# Patient Record
Sex: FEMALE | URBAN - METROPOLITAN AREA
[De-identification: names, ages, dates, MRNs, and addresses within clinical notes are randomized per-mention and may not be internally consistent; named-entity substitution may affect disease eponyms.]

---

## 2022-01-02 ENCOUNTER — LAB REQUISITION (OUTPATIENT)
Dept: LAB | Facility: HOSPITAL | Age: 77
End: 2022-01-02
Attending: INTERNAL MEDICINE
Payer: COMMERCIAL

## 2022-01-02 DIAGNOSIS — R05.9 COUGH, UNSPECIFIED: ICD-10-CM

## 2022-01-02 PROCEDURE — 30099996 STAT DAYTIME VENIPUNCTURE: Performed by: INTERNAL MEDICINE

## 2022-11-11 ENCOUNTER — APPOINTMENT (OUTPATIENT)
Dept: RADIOLOGY | Facility: CLINIC | Age: 77
End: 2022-11-11

## 2022-11-11 ENCOUNTER — OFFICE VISIT (OUTPATIENT)
Dept: OBGYN CLINIC | Facility: CLINIC | Age: 77
End: 2022-11-11

## 2022-11-11 VITALS
SYSTOLIC BLOOD PRESSURE: 133 MMHG | HEIGHT: 63 IN | WEIGHT: 191 LBS | BODY MASS INDEX: 33.84 KG/M2 | DIASTOLIC BLOOD PRESSURE: 79 MMHG | HEART RATE: 91 BPM

## 2022-11-11 DIAGNOSIS — M25.561 RIGHT KNEE PAIN, UNSPECIFIED CHRONICITY: ICD-10-CM

## 2022-11-11 DIAGNOSIS — M25.562 LEFT KNEE PAIN, UNSPECIFIED CHRONICITY: ICD-10-CM

## 2022-11-11 DIAGNOSIS — M17.12 PRIMARY OSTEOARTHRITIS OF LEFT KNEE: Primary | ICD-10-CM

## 2022-11-11 DIAGNOSIS — M17.11 PRIMARY OSTEOARTHRITIS OF RIGHT KNEE: ICD-10-CM

## 2022-11-11 RX ORDER — OMEPRAZOLE 20 MG/1
20 CAPSULE, DELAYED RELEASE ORAL DAILY
COMMUNITY

## 2022-11-11 RX ORDER — TRIAMCINOLONE ACETONIDE 40 MG/ML
40 INJECTION, SUSPENSION INTRA-ARTICULAR; INTRAMUSCULAR
Status: COMPLETED | OUTPATIENT
Start: 2022-11-11 | End: 2022-11-11

## 2022-11-11 RX ORDER — LIDOCAINE HYDROCHLORIDE 5 MG/ML
4 INJECTION, SOLUTION INFILTRATION; PERINEURAL
Status: COMPLETED | OUTPATIENT
Start: 2022-11-11 | End: 2022-11-11

## 2022-11-11 RX ORDER — LEVOTHYROXINE SODIUM 0.05 MG/1
50 TABLET ORAL DAILY
COMMUNITY
Start: 2022-08-31

## 2022-11-11 RX ORDER — TAMSULOSIN HYDROCHLORIDE 0.4 MG/1
0.4 CAPSULE ORAL
COMMUNITY

## 2022-11-11 RX ORDER — AMLODIPINE BESYLATE 10 MG/1
10 TABLET ORAL DAILY
COMMUNITY

## 2022-11-11 RX ORDER — ATORVASTATIN CALCIUM 20 MG/1
20 TABLET, FILM COATED ORAL DAILY
COMMUNITY
Start: 2022-11-09

## 2022-11-11 RX ADMIN — LIDOCAINE HYDROCHLORIDE 4 ML: 5 INJECTION, SOLUTION INFILTRATION; PERINEURAL at 18:49

## 2022-11-11 RX ADMIN — TRIAMCINOLONE ACETONIDE 40 MG: 40 INJECTION, SUSPENSION INTRA-ARTICULAR; INTRAMUSCULAR at 18:49

## 2022-11-11 NOTE — PROGRESS NOTES
Knee New Office Note    Assessment:     1  Primary osteoarthritis of left knee    2  Primary osteoarthritis of right knee        Plan:  Findings today are consistent with bilateral knee osteoarthritis  Imaging and prognosis was reviewed with the patient today  Discussed conservative treatments with patient today (CSI, tylenol, Voltaren Gel/ Aspercreme w/ lidocaine, staying active)  Patient would like to proceed with Tylenol as needed: 1000mg every 8 hours - do not exceed 3000mg daily, Aspercreme w/ lidocaine and CSI  Recommend only doing a CSI to the right knee, due to patient being a diabetic  Cortisone steroid injection was administered today to the right knee  Patient should avoid strenuous activities for the next 1-2 days  Patient should avoid vaccines for the next 2 weeks if possible  Should monitor glucose levels for the next 7 to 10 days  Can apply cold compress for soreness  If patient feels relief with the cortisone injections, procedure can be repeated every 3 months  If patient sees minimal/no relief with cortisone injection, treatment with VISCO injections can be further discussed  Patient will consider a future CSI of the left knee  Follow up as needed  Problem List Items Addressed This Visit    None     Visit Diagnoses     Primary osteoarthritis of left knee    -  Primary    Primary osteoarthritis of right knee             Subjective:     Patient ID: Tawni Opitz is a 65 y o  female  Patient seen in consultation at the request of Dr Hussain Beltran MD      Chief Complaint:  HPI:  The patient presents with a chief complaint of bilateral knee pain- right worse than the left  The pain began several months ago and is not associated with an acute injury  Patient notes the pain has increased over the last couple weeks  The patient describes the pain as aching and sharp  It is intermittent in timing, and localizes the pain to the medial joint line   The pain is worse with activities, walking, ascending stairs and descending stairs and relieved with rest, ice, and elevation  She denies mechanical symptoms such as locking and catching  She admits to instability of the knee  Patient has not had any previous treatment  Patient is a diabetic and has kidney disease and can not take NSAIDS  Allergy:  No Known Allergies  Medications:  all current active meds have been reviewed  Past Medical History:  History reviewed  No pertinent past medical history  Past Surgical History:  History reviewed  No pertinent surgical history  Family History:  History reviewed  No pertinent family history  Social History:  Social History     Substance and Sexual Activity   Alcohol Use None     Social History     Substance and Sexual Activity   Drug Use Not on file     Social History     Tobacco Use   Smoking Status Never Smoker   Smokeless Tobacco Never Used           ROS:  General: Per HPI  Skin: Negative, except if noted below  HEENT: Negative  Respiratory: Negative  Cardiovascular: Negative  Gastrointestinal: Negative  Urinary: Negative  Vascular: Negative  Musculoskeletal: Positive per HPI   Neurologic: Positive per HPI  Endocrine: Negative    Objective:  BP Readings from Last 1 Encounters:   11/11/22 133/79      Wt Readings from Last 1 Encounters:   11/11/22 86 6 kg (191 lb)        Respiratory:   non-labored respirations    Lymphatics:  no palpable lymph nodes    Gait:   wheelchair    Neurologic:   Alert and oriented times 3  Patient with normal sensation except as noted below  Deep tendon reflexes 2+ except as noted in MSK exam    Bilateral Lower Extremity:  Left Knee:       Inspection:  Skin intact    Overall limb alignment normal    Effusion: none    ROM 5-110 w/ pain    Extensor Lag: none    Palpation:medial Joint line tenderness to palpation    AP Stability at 90 deg stable    M/L stability in full extension stable    M/L stability in midflexion stable    Motor: 4/5 IP/Q/HS/TA/GS    Pulses: 2+ DP / 2+ PT    SILT DP/SP/S/S/TN    Right knee: Inspection: skin intact     Overall limb alignment normal    Effusion: none    ROM 5-110 w/ pain    Extensor Lag: none    Palpation: medial Joint line tenderness to palpation    AP Stability at 90 deg stable    M/L stability in full extension stable    M/L stability in midflexion stable    Motor: 5/5 IP/Q/HS/TA/GS    Pulses: 2+ DP / 2+ PT    SILT DP/SP/S/S/TN    Imaging:  My interpretation XR AP scanogram/AP bilateral knee/lateral/wetzel/sunrise bilateral knee: moderate joint space narrowing, subchondral sclerosis, subchondral cysts, osteophyte formation  No fracture or dislocation  BMI:   Estimated body mass index is 33 83 kg/m² as calculated from the following:    Height as of this encounter: 5' 3" (1 6 m)  Weight as of this encounter: 86 6 kg (191 lb)  BSA:   Estimated body surface area is 1 9 meters squared as calculated from the following:    Height as of this encounter: 5' 3" (1 6 m)  Weight as of this encounter: 86 6 kg (191 lb)  Large joint arthrocentesis: R knee  Universal Protocol:  Consent: Verbal consent obtained    Risks and benefits: risks, benefits and alternatives were discussed  Consent given by: patient  Patient understanding: patient states understanding of the procedure being performed    Supporting Documentation  Indications: pain   Procedure Details  Location: knee - R knee  Needle size: 22 G  Approach: anterolateral  Medications administered: 4 mL lidocaine 0 5 %; 40 mg triamcinolone acetonide 40 mg/mL    Patient tolerance: patient tolerated the procedure well with no immediate complications  Dressing:  Sterile dressing applied                   Scribe Attestation    I,:  Shyam Goff am acting as a scribe while in the presence of the attending physician :       I,:  Reyes Dimas DO personally performed the services described in this documentation    as scribed in my presence :

## 2022-12-09 ENCOUNTER — OFFICE VISIT (OUTPATIENT)
Dept: OBGYN CLINIC | Facility: CLINIC | Age: 77
End: 2022-12-09

## 2022-12-09 VITALS
WEIGHT: 180 LBS | DIASTOLIC BLOOD PRESSURE: 80 MMHG | BODY MASS INDEX: 31.89 KG/M2 | HEIGHT: 63 IN | SYSTOLIC BLOOD PRESSURE: 136 MMHG

## 2022-12-09 DIAGNOSIS — M17.12 PRIMARY OSTEOARTHRITIS OF LEFT KNEE: Primary | ICD-10-CM

## 2022-12-09 DIAGNOSIS — M17.11 PRIMARY OSTEOARTHRITIS OF RIGHT KNEE: ICD-10-CM

## 2022-12-09 RX ORDER — TRIAMCINOLONE ACETONIDE 40 MG/ML
40 INJECTION, SUSPENSION INTRA-ARTICULAR; INTRAMUSCULAR
Status: COMPLETED | OUTPATIENT
Start: 2022-12-09 | End: 2022-12-09

## 2022-12-09 RX ADMIN — TRIAMCINOLONE ACETONIDE 40 MG: 40 INJECTION, SUSPENSION INTRA-ARTICULAR; INTRAMUSCULAR at 11:32

## 2022-12-09 NOTE — PROGRESS NOTES
Knee Follow up  Office Note    Assessment:     1  Primary osteoarthritis of left knee    2  Primary osteoarthritis of right knee        Plan:     Problem List Items Addressed This Visit    None  Visit Diagnoses     Primary osteoarthritis of left knee    -  Primary    Relevant Orders    Injection Procedure Prior Authorization    Large joint arthrocentesis: L knee    Primary osteoarthritis of right knee        Relevant Orders    Injection Procedure Prior Authorization    Large joint arthrocentesis: L knee          66 y/o female with bilateral knee pain secondary to OA  She had a cortisone injection into the right knee at last visit with short term relief in symptoms  She continues with pain in the left knee as well  Cortisone injection performed into the left knee today  Discussed that since she is only getting short term relief with the cortisone, we will also order visco injections for the bilateral knees  Follow up to begin visco      Subjective:     Patient ID: Otilia Martel is a 65 y o  female  Chief Complaint:  HPI:  The patient presents with a chief complaint of bilateral knee pain  She has known OA of the bilateral knees  The pain began several months ago and is not associated with an acute injury  The pain is worse with activities, walking, ascending stairs and descending stairs and relieved with rest, ice, and elevation  Patient is a diabetic and has kidney disease and can not take NSAIDS  At last visit she had a cortisone injection into the Right knee with only about 2 weeks of relief  She presents today for a cortisone injection into her LEFT knee  Allergy:  No Known Allergies  Medications:  all current active meds have been reviewed  Past Medical History:  History reviewed  No pertinent past medical history  Past Surgical History:  History reviewed  No pertinent surgical history  Family History:  History reviewed  No pertinent family history    Social History:  Social History     Substance and Sexual Activity   Alcohol Use None     Social History     Substance and Sexual Activity   Drug Use Not on file     Social History     Tobacco Use   Smoking Status Never   Smokeless Tobacco Never           ROS:  General: Per HPI  Skin: Negative, except if noted below  HEENT: Negative  Respiratory: Negative  Cardiovascular: Negative  Gastrointestinal: Negative  Urinary: Negative  Vascular: Negative  Musculoskeletal: Positive per HPI   Neurologic: Positive per HPI  Endocrine: Negative    Objective:  BP Readings from Last 1 Encounters:   12/09/22 136/80      Wt Readings from Last 1 Encounters:   12/09/22 81 6 kg (180 lb)        Respiratory:   non-labored respirations    Lymphatics:  no palpable lymph nodes    Gait:   Altered, with rollator walker  Neurologic:   Alert and oriented times 3  Patient with normal sensation except as noted below  Deep tendon reflexes 2+ except as noted in MSK exam    Bilateral Lower Extremity:  Left Knee: Inspection:  Skin intact    Overall limb alignment normal    Effusion: none    ROM 5-110 w/ pain    Extensor Lag: none    Palpation:medial Joint line tenderness to palpation    AP Stability at 90 deg stable    M/L stability in full extension stable    M/L stability in midflexion stable    Motor: 4/5 IP/Q/HS/TA/GS    Pulses: 2+ DP / 2+ PT    SILT DP/SP/S/S/TN     Right knee: Inspection: skin intact     Overall limb alignment normal    Effusion: none    ROM 5-110 w/ pain    Extensor Lag: none    Palpation: medial Joint line tenderness to palpation    AP Stability at 90 deg stable    M/L stability in full extension stable    M/L stability in midflexion stable    Motor: 5/5 IP/Q/HS/TA/GS    Pulses: 2+ DP / 2+ PT    SILT DP/SP/S/S/TN    Imaging:  No new imaging today    BMI:   Estimated body mass index is 31 89 kg/m² as calculated from the following:    Height as of this encounter: 5' 3" (1 6 m)  Weight as of this encounter: 81 6 kg (180 lb)    BSA:   Estimated body surface area is 1 85 meters squared as calculated from the following:    Height as of this encounter: 5' 3" (1 6 m)  Weight as of this encounter: 81 6 kg (180 lb)  Large joint arthrocentesis: L knee  Universal Protocol:  Consent: Verbal consent obtained    Risks and benefits: risks, benefits and alternatives were discussed  Consent given by: patient  Patient understanding: patient states understanding of the procedure being performed    Supporting Documentation  Indications: pain   Procedure Details  Location: knee - L knee  Preparation: Patient was prepped and draped in the usual sterile fashion  Needle size: 22 G  Approach: anterolateral  Medications administered: 40 mg triamcinolone acetonide 40 mg/mL (4mL 0 5% ropivicaine)    Patient tolerance: patient tolerated the procedure well with no immediate complications  Dressing:  Sterile dressing applied          Scribe Attestation    I,:  Tawnya Yates PA-C am acting as a scribe while in the presence of the attending physician :       I,:  Agustina Matt DO personally performed the services described in this documentation    as scribed in my presence :

## 2022-12-16 ENCOUNTER — PROCEDURE VISIT (OUTPATIENT)
Dept: OBGYN CLINIC | Facility: CLINIC | Age: 77
End: 2022-12-16

## 2022-12-16 VITALS
BODY MASS INDEX: 31.89 KG/M2 | WEIGHT: 180 LBS | DIASTOLIC BLOOD PRESSURE: 78 MMHG | HEIGHT: 63 IN | SYSTOLIC BLOOD PRESSURE: 132 MMHG

## 2022-12-16 DIAGNOSIS — M17.11 PRIMARY OSTEOARTHRITIS OF RIGHT KNEE: Primary | ICD-10-CM

## 2022-12-16 DIAGNOSIS — M17.12 PRIMARY OSTEOARTHRITIS OF LEFT KNEE: ICD-10-CM

## 2022-12-16 NOTE — PROGRESS NOTES
[facility] bilateral knees      Large joint arthrocentesis: bilateral knee  Universal Protocol:  Consent: Verbal consent obtained  Risks and benefits: risks, benefits and alternatives were discussed  Consent given by: patient  Patient understanding: patient states understanding of the procedure being performed  Patient identity confirmed: verbally with patient    Supporting Documentation  Indications: pain   Procedure Details  Location: knee - bilateral knee  Needle size: 22 G  Approach: anterolateral    Medications (Right): 88 mg hyaluronan 88 MG/4MLMedications (Left): 88 mg hyaluronan 88 MG/4ML   Patient tolerance: patient tolerated the procedure well with no immediate complications  Dressing:  Sterile dressing applied          1  Primary osteoarthritis of right knee        2   Primary osteoarthritis of left knee               Miguel Dorado PA-C

## 2023-04-14 LAB
LEFT EYE DIABETIC RETINOPATHY: NORMAL
RIGHT EYE DIABETIC RETINOPATHY: NORMAL

## 2023-09-14 ENCOUNTER — CONSULT (OUTPATIENT)
Age: 78
End: 2023-09-14
Payer: COMMERCIAL

## 2023-09-14 VITALS
SYSTOLIC BLOOD PRESSURE: 126 MMHG | DIASTOLIC BLOOD PRESSURE: 70 MMHG | OXYGEN SATURATION: 96 % | WEIGHT: 188 LBS | TEMPERATURE: 97.7 F | BODY MASS INDEX: 33.31 KG/M2 | HEIGHT: 63 IN | HEART RATE: 80 BPM

## 2023-09-14 DIAGNOSIS — Z86.718 HISTORY OF DVT OF LOWER EXTREMITY: ICD-10-CM

## 2023-09-14 DIAGNOSIS — J84.10 PULMONARY FIBROSIS (HCC): Primary | ICD-10-CM

## 2023-09-14 PROBLEM — E11.9 TYPE 2 DIABETES MELLITUS (HCC): Status: ACTIVE | Noted: 2023-09-14

## 2023-09-14 PROCEDURE — 99204 OFFICE O/P NEW MOD 45 MIN: CPT | Performed by: INTERNAL MEDICINE

## 2023-09-14 RX ORDER — ESCITALOPRAM OXALATE 20 MG/1
20 TABLET ORAL
COMMUNITY
Start: 2023-06-21

## 2023-09-14 RX ORDER — LEVOTHYROXINE SODIUM 112 UG/1
112 TABLET ORAL DAILY
COMMUNITY
Start: 2023-08-07

## 2023-09-14 NOTE — ASSESSMENT & PLAN NOTE
Patient has a given diagnosis of pulmonary fibrosis going back to July 2021. I do not have any imaging or PFTs to review and the patient states these were done greater than 1 year ago. We will obtain a new baseline with PFTs and high-resolution chest CT. We will also obtain records from Dr. Jazmin Lee office for comparison. Depending on those findings and whether there is evidence of progression, may need to consider further testing and treatment options. It is unclear whether she was tested for rheumatologic conditions at the time of diagnosis. If she has not, we will order those tests as well.

## 2023-09-14 NOTE — PROGRESS NOTES
Pulmonary Outpatient Consultation Note   Adam Santana 77 y.o. female MRN: [id number]  9/14/2023    Referring provider: No referring provider defined for this encounter. Assessment/Plan:      Pulmonary fibrosis ([address])  Patient has a given diagnosis of pulmonary fibrosis going back to July 2021. I do not have any imaging or PFTs to review and the patient states these were done greater than 1 year ago. We will obtain a new baseline with PFTs and high-resolution chest CT. We will also obtain records from Dr. Ml Crane office for comparison. Depending on those findings and whether there is evidence of progression, may need to consider further testing and treatment options. It is unclear whether she was tested for rheumatologic conditions at the time of diagnosis. If she has not, we will order those tests as well. Visit orders:    Diagnoses and all orders for this visit:    Pulmonary fibrosis ([address])  -     CT chest high resolution; Future  -     Complete PFT with post bronchodilator; Future    History of DVT of lower extremity    Other orders  -     escitalopram (LEXAPRO) 20 mg tablet; Take 20 mg by mouth daily at bedtime  -     levothyroxine 112 mcg tablet; Take 112 mcg by mouth daily        History of Present Illness   HPI:  Adam Santana is a 77 y.o. female who presents today to establish pulmonary care. She was hospitalized in July 2021 and diagnosed with pneumonia. She describes a fairly prolonged hospitalization, followed by rehab stay due to inability to walk. There is some question as to whether she was paralyzed at that time. She was subsequently diagnosed with pulmonary fibrosis as a reason for progressive shortness of breath. She was on prednisone for greater than 1 year at doses as high as 30 mg daily. She became diabetic and is now on metformin. She did not find that the prednisone helped with her breathlessness.   From pulmonary perspective, she feels that her shortness of breath has been relatively unchanged over the past year. She finds it difficult to ambulate, both from a pulmonary standpoint as well as lower extremity weakness. She is able to ambulate more than she had 2 years ago and is now living independently. He still leads a very sedentary lifestyle. She was never diagnosed with asthma or COPD and she is a lifelong non-smoker. She denies cough or sputum production. She denies wheezing. She does not use inhalers or home oxygen. She has been following with Dr. Sanam Castillo at [facility] since that time, but he recently moved out of the area. She has osteoarthritis and denies joint swelling. She has never been diagnosed with any particular rheumatologic disorder but does report Raynaud's syndrome in her fingers and toes. Denies heartburn, indigestion or aspiration events. She had pneumonia at the age of 7 and was told she had scarring on her lungs since that time. She had a DVT in 2020 and is maintained on Eliquis. She has no pets at home. She has no occupational exposures to speak of. Review of Systems   Constitutional: Negative for chills, fever and unexpected weight change. HENT: Negative for postnasal drip and sore throat. Eyes: Negative for visual disturbance. Respiratory:        As noted in HPI   Cardiovascular: Negative for chest pain. Gastrointestinal: Negative for abdominal pain, diarrhea and vomiting. Musculoskeletal: Negative for arthralgias. Skin: Negative for rash. Neurological: Negative for headaches. Hematological: Negative for adenopathy. Psychiatric/Behavioral: Negative. All other systems reviewed and are negative.         Historical Information   Past Medical History:   Diagnosis Date   • Cataract 03/2023   • Deep vein thrombosis (HCC)    • Diabetes mellitus ([address])    • Hypertension    • Hypothyroid    • Osteoarthritis    • Pulmonary fibrosis [facility])      Past Surgical History:   Procedure Laterality Date   • SKIN CANCER EXCISION       Family History   Problem Relation Age of Onset   • Breast cancer Mother    • Heart disease Father        Social History     Tobacco Use   Smoking Status Never   Smokeless Tobacco Never       Meds/Allergies     Current Outpatient Medications:   •  amLODIPine (NORVASC) 10 mg tablet, Take 10 mg by mouth daily, Disp: , Rfl:   •  apixaban (Eliquis) 5 mg, Take 5 mg by mouth 1 (one) time, Disp: , Rfl:   •  escitalopram (LEXAPRO) 20 mg tablet, Take 20 mg by mouth daily at bedtime, Disp: , Rfl:   •  levothyroxine 112 mcg tablet, Take 112 mcg by mouth daily, Disp: , Rfl:   •  omeprazole (PriLOSEC) 20 mg delayed release capsule, Take 20 mg by mouth daily, Disp: , Rfl:   •  tamsulosin (FLOMAX) 0.4 mg, Take 0.4 mg by mouth daily with dinner, Disp: , Rfl:   •  atorvastatin (LIPITOR) 20 mg tablet, Take 20 mg by mouth daily (Patient not taking: Reported on 9/14/2023), Disp: , Rfl:   •  levothyroxine 50 mcg tablet, Take 50 mcg by mouth daily (Patient not taking: Reported on 9/14/2023), Disp: , Rfl:   •  metFORMIN (GLUCOPHAGE) 500 mg tablet, Take 500 mg by mouth daily (Patient not taking: Reported on 9/14/2023), Disp: , Rfl:   No Known Allergies    Vitals: Blood pressure 126/70, pulse 80, temperature 97.7 °F (36.5 °C), height 5' 3" (1.6 m), weight 85.3 kg (188 lb), SpO2 96 %. , Body mass index is 33.3 kg/m². Oxygen Therapy  SpO2: 96 %  Oxygen Therapy: None (Room air)    Physical Exam   Physical Exam  Constitutional:       General: She is not in acute distress. Appearance: Normal appearance. HENT:      Head: Normocephalic. Mouth/Throat:      Pharynx: No oropharyngeal exudate. Eyes:      General: No scleral icterus. Neck:      Vascular: No JVD. Cardiovascular:      Rate and Rhythm: Normal rate and regular rhythm. Pulmonary:      Breath sounds: Rales (At the bases) present. No wheezing or rhonchi. Abdominal:      Palpations: Abdomen is soft. Tenderness: There is no abdominal tenderness. Musculoskeletal:         General: No deformity. Cervical back: Neck supple. Lymphadenopathy:      Cervical: No cervical adenopathy. Skin:     General: Skin is warm and dry. Neurological:      Mental Status: She is alert and oriented to person, place, and time.    Psychiatric:         Mood and Affect: Mood normal.

## 2023-10-18 ENCOUNTER — HOSPITAL ENCOUNTER (OUTPATIENT)
Dept: PULMONOLOGY | Facility: HOSPITAL | Age: 78
Discharge: HOME | End: 2023-10-18
Attending: INTERNAL MEDICINE
Payer: COMMERCIAL

## 2023-10-18 DIAGNOSIS — J84.10 PULMONARY FIBROSIS (HCC): ICD-10-CM

## 2023-10-18 PROCEDURE — 94729 DIFFUSING CAPACITY: CPT

## 2023-10-18 PROCEDURE — 94060 EVALUATION OF WHEEZING: CPT

## 2023-10-18 PROCEDURE — 94726 PLETHYSMOGRAPHY LUNG VOLUMES: CPT | Performed by: INTERNAL MEDICINE

## 2023-10-18 PROCEDURE — 94760 N-INVAS EAR/PLS OXIMETRY 1: CPT

## 2023-10-18 PROCEDURE — 94726 PLETHYSMOGRAPHY LUNG VOLUMES: CPT

## 2023-10-18 PROCEDURE — 94060 EVALUATION OF WHEEZING: CPT | Performed by: INTERNAL MEDICINE

## 2023-10-18 PROCEDURE — 94729 DIFFUSING CAPACITY: CPT | Performed by: INTERNAL MEDICINE

## 2023-10-18 RX ORDER — ALBUTEROL SULFATE 2.5 MG/3ML
2.5 SOLUTION RESPIRATORY (INHALATION) ONCE
Status: COMPLETED | OUTPATIENT
Start: 2023-10-18 | End: 2023-10-18

## 2023-10-18 RX ADMIN — ALBUTEROL SULFATE 2.5 MG: 2.5 SOLUTION RESPIRATORY (INHALATION) at 10:52

## 2023-10-30 ENCOUNTER — HOSPITAL ENCOUNTER (OUTPATIENT)
Dept: CT IMAGING | Facility: HOSPITAL | Age: 78
Discharge: HOME | End: 2023-10-30
Attending: INTERNAL MEDICINE
Payer: COMMERCIAL

## 2023-10-30 ENCOUNTER — APPOINTMENT (OUTPATIENT)
Dept: RADIOLOGY | Facility: HOSPITAL | Age: 78
End: 2023-10-30
Payer: COMMERCIAL

## 2023-10-30 DIAGNOSIS — J84.10 PULMONARY FIBROSIS (HCC): ICD-10-CM

## 2023-10-30 PROCEDURE — G1004 CDSM NDSC: HCPCS

## 2023-10-30 PROCEDURE — 71250 CT THORAX DX C-: CPT

## 2023-11-10 DIAGNOSIS — J84.10 PULMONARY FIBROSIS (HCC): Primary | ICD-10-CM

## 2023-11-10 NOTE — PROGRESS NOTES
Pulmonary function testing with moderate restriction and severe diffusion impairment. Chest CT concerning for UIP pattern. I will send off serologies working up rheumatologic etiology and have the patient follow-up with me thereafter to discuss whether we should initiate antifibrotic agents.

## 2023-12-01 ENCOUNTER — TELEPHONE (OUTPATIENT)
Age: 78
End: 2023-12-01

## 2023-12-06 ENCOUNTER — TELEPHONE (OUTPATIENT)
Dept: FAMILY MEDICINE CLINIC | Facility: CLINIC | Age: 78
End: 2023-12-06

## 2023-12-06 ENCOUNTER — OFFICE VISIT (OUTPATIENT)
Dept: FAMILY MEDICINE CLINIC | Facility: CLINIC | Age: 78
End: 2023-12-06
Payer: COMMERCIAL

## 2023-12-06 VITALS
OXYGEN SATURATION: 100 % | DIASTOLIC BLOOD PRESSURE: 60 MMHG | HEART RATE: 90 BPM | WEIGHT: 182 LBS | HEIGHT: 62 IN | TEMPERATURE: 98.7 F | RESPIRATION RATE: 18 BRPM | BODY MASS INDEX: 33.49 KG/M2 | SYSTOLIC BLOOD PRESSURE: 140 MMHG

## 2023-12-06 DIAGNOSIS — E78.00 HYPERCHOLESTEROLEMIA: ICD-10-CM

## 2023-12-06 DIAGNOSIS — Z86.711 HISTORY OF PULMONARY EMBOLISM: ICD-10-CM

## 2023-12-06 DIAGNOSIS — M54.50 LOW BACK PAIN POTENTIALLY ASSOCIATED WITH RADICULOPATHY: ICD-10-CM

## 2023-12-06 DIAGNOSIS — E11.9 TYPE 2 DIABETES MELLITUS WITHOUT COMPLICATION, WITHOUT LONG-TERM CURRENT USE OF INSULIN (HCC): ICD-10-CM

## 2023-12-06 DIAGNOSIS — Z78.0 POST-MENOPAUSAL: ICD-10-CM

## 2023-12-06 DIAGNOSIS — F32.1 CURRENT MODERATE EPISODE OF MAJOR DEPRESSIVE DISORDER WITHOUT PRIOR EPISODE (HCC): ICD-10-CM

## 2023-12-06 DIAGNOSIS — R53.83 OTHER FATIGUE: ICD-10-CM

## 2023-12-06 DIAGNOSIS — Z86.718 HISTORY OF DVT OF LOWER EXTREMITY: ICD-10-CM

## 2023-12-06 DIAGNOSIS — J84.10 PULMONARY FIBROSIS (HCC): Primary | ICD-10-CM

## 2023-12-06 PROBLEM — K57.90 DIVERTICULOSIS: Status: ACTIVE | Noted: 2023-12-06

## 2023-12-06 PROBLEM — R76.8 ANA POSITIVE: Status: ACTIVE | Noted: 2023-12-06

## 2023-12-06 PROBLEM — E66.9 OBESITY: Status: ACTIVE | Noted: 2023-12-06

## 2023-12-06 PROBLEM — Z79.01 ANTICOAGULATED: Status: ACTIVE | Noted: 2023-12-06

## 2023-12-06 PROBLEM — J84.9 INTERSTITIAL LUNG DISEASE (HCC): Status: ACTIVE | Noted: 2022-09-29

## 2023-12-06 PROBLEM — M17.0 OSTEOARTHRITIS OF BOTH KNEES: Status: ACTIVE | Noted: 2023-12-06

## 2023-12-06 LAB — SL AMB POCT HEMOGLOBIN AIC: 5.7 (ref ?–6.5)

## 2023-12-06 PROCEDURE — 83036 HEMOGLOBIN GLYCOSYLATED A1C: CPT | Performed by: FAMILY MEDICINE

## 2023-12-06 PROCEDURE — 99204 OFFICE O/P NEW MOD 45 MIN: CPT | Performed by: FAMILY MEDICINE

## 2023-12-06 NOTE — PROGRESS NOTES
Name: Gennaro Anthony      : 1945      MRN: [id number]  Encounter Provider: Moises Diaz DO  Encounter Date: 2023   Encounter department: [facility] [address]     1. Pulmonary fibrosis [facility])  Patient seeing [facility] pulmonology for this. 2. Type 2 diabetes mellitus without complication, without long-term current use of insulin (HCC)  -     POCT hemoglobin A1c  -     Albumin / creatinine urine ratio; Future; Expected date: 2023  -     Comprehensive metabolic panel; Future; Expected date: 2023  -     Albumin / creatinine urine ratio  -     Comprehensive metabolic panel  Patient denies this diagnosis   She is on no medications for diabetes  Sounds like sugars were elevated from her steroid use for the pulmonary fibrosis. A1c in office today was 5.7. Did collect urine microalbumin to send out. Will obtain records. 3. Current moderate episode of major depressive disorder without prior episode (HCC)  Stable on lexapro 20 mg once daily  4. History of DVT of lower extremity  On eliquis 5 mg once daily for her history of DVT and PE  5. History of pulmonary embolism    6. Hypercholesterolemia  -     Lipid Panel with Direct LDL reflex; Future; Expected date: 2023  -     Lipid Panel with Direct LDL reflex  She has hyperlipidemia and on atorvastatin. Tolerates well  Check lipid panel  7. Other fatigue  -     TSH, 3rd generation with Free T4 reflex; Future; Expected date: 2023  -     CBC and differential; Future  -     TSH, 3rd generation with Free T4 reflex  -     CBC and differential    8. BMI 33.0-33.9,adult    9. Low back pain potentially associated with radiculopathy  -     XR spine lumbar minimum 4 views non injury; Future; Expected date: 2023  -     Ambulatory Referral to Physical Therapy; Future  Refer to PT  Check xray lumbar spine  Obtain records.  She has previously been to pain management for injections and saw chiropractor  10. Post-menopausal  -     DXA bone density spine hip and pelvis; Future; Expected date: 12/06/2023    Diabetic Foot Exam    Patient's shoes and socks removed. Right Foot/Ankle   Right Foot Inspection  Skin Exam: skin normal, skin intact and dry skin. No warmth, no callus, no erythema, no maceration, no abnormal color, no pre-ulcer, no ulcer and no callus. Toe Exam: ROM and strength within normal limits. Sensory   Vibration: intact  Proprioception: intact  Monofilament testing: intact    Vascular  Capillary refills: < 3 seconds  The right DP pulse is 2+. The right PT pulse is 2+. Left Foot/Ankle  Left Foot Inspection  Skin Exam: skin normal, skin intact and dry skin. No warmth, no erythema, no maceration, normal color, no pre-ulcer, no ulcer and no callus. Toe Exam: ROM and strength within normal limits. Sensory   Vibration: intact  Proprioception: intact  Monofilament testing: intact    Vascular  Capillary refills: < 3 seconds  The left DP pulse is 2+. The left PT pulse is 2+. Assign Risk Category  No deformity present  No loss of protective sensation  No weak pulses  Risk: 0  BMI Counseling: Body mass index is 33.83 kg/m². The BMI is above normal. Nutrition recommendations include decreasing portion sizes, encouraging healthy choices of fruits and vegetables and moderation in carbohydrate intake. Exercise recommendations include exercising 3-5 times per week. No pharmacotherapy was ordered. Rationale for BMI follow-up plan is due to patient being overweight or obese. Depression Screening and Follow-up Plan: Patient was screened for depression during today's encounter. They screened negative with a PHQ-2 score of 0. Subjective     HPI  Patient is a 77year old female who is being seen today as a new patient to establish care. Her previous PCP=Dr. Cooley at [facility]. There are no records for review at time of today's visit.    She is accompanied to today's visit by her aide. All history obtained from patient. Did not get flu vaccine this season. Declines. Chronic medical problems as noted below:  Patient Active Problem List   Diagnosis   • Pulmonary fibrosis (HCC)--follows with [facility] pulmonology (Dr Ricardo Nguyen)   • Type 2 diabetes mellitus (HCC)--started after being on high doses of prednisone in  for her pulmonary fibrosis   • History of DVT of lower extremity-not sure which leg; on eliquis   • ROHIT positive   • Anticoagulated   • Diverticulosis   • History of pulmonary embolism   • Hypercholesterolemia   • Interstitial lung disease ([facility])  Low back pain with radiculopathy--saw chiropractor and pain management. Had injections. Does not recall who she saw. Her pain is getting worse. Difficulty standing up straight due to her pain. • Obesity  Depression--on lexapro for the last year. PHQ-2/9 Depression Screening    Little interest or pleasure in doing things: 0 - not at all  Feeling down, depressed, or hopeless: 0 - not at all  Trouble falling or staying asleep, or sleeping too much: 0 - not at all  Feeling tired or having little energy: 0 - not at all  Poor appetite or overeatin - not at all  Feeling bad about yourself - or that you are a failure or have let yourself or your family down: 0 - not at all  Trouble concentrating on things, such as reading the newspaper or watching television: 0 - not at all  Moving or speaking so slowly that other people could have noticed.  Or the opposite - being so fidgety or restless that you have been moving around a lot more than usual: 0 - not at all  Thoughts that you would be better off dead, or of hurting yourself in some way: 0 - not at all  PHQ-2 Score: 0  PHQ-2 Interpretation: Negative depression screen  PHQ-9 Score: 0   PHQ-9 Interpretation: No or Minimal depression               Review of Systems    Past Medical History:   Diagnosis Date   • Cataract 2023   • Deep vein thrombosis ([facility])    • Diabetes mellitus ([address])    • Diverticulitis    • Hypertension    • Hypothyroid    • Osteoarthritis    • Pneumonia    • Pulmonary fibrosis ([address])      Past Surgical History:   Procedure Laterality Date   • CATARACT EXTRACTION     • CHOLECYSTECTOMY     • SKIN CANCER EXCISION       Family History   Problem Relation Age of Onset   • Breast cancer Mother    • Heart disease Father      Social History     Socioeconomic History   • Marital status: /Civil Union     Spouse name: None   • Number of children: None   • Years of education: None   • Highest education level: None   Occupational History   • None   Tobacco Use   • Smoking status: Never   • Smokeless tobacco: Never   Vaping Use   • Vaping Use: Never used   Substance and Sexual Activity   • Alcohol use: Not Currently   • Drug use: Never   • Sexual activity: Not Currently     Partners: Male   Other Topics Concern   • None   Social History Narrative     since 4    Has son and daughter     Lives alone in an apartment     Social Determinants of Health     Financial Resource Strain: Not on file   Food Insecurity: Not on file   Transportation Needs: Not on file   Physical Activity: Not on file   Stress: Not on file   Social Connections: Not on file   Intimate Partner Violence: Not on file   Housing Stability: Not on file     Current Outpatient Medications on File Prior to Visit   Medication Sig   • amLODIPine (NORVASC) 10 mg tablet Take 10 mg by mouth daily   • apixaban (Eliquis) 5 mg Take 5 mg by mouth 1 (one) time   • atorvastatin (LIPITOR) 20 mg tablet Take 20 mg by mouth daily   • escitalopram (LEXAPRO) 20 mg tablet Take 20 mg by mouth daily at bedtime   • levothyroxine 112 mcg tablet Take 112 mcg by mouth daily   • omeprazole (PriLOSEC) 20 mg delayed release capsule Take 20 mg by mouth as needed   • tamsulosin (FLOMAX) 0.4 mg Take 0.4 mg by mouth daily with dinner   • [DISCONTINUED] levothyroxine 50 mcg tablet Take 50 mcg by mouth daily (Patient not taking: Reported on 9/14/2023)   • [DISCONTINUED] metFORMIN (GLUCOPHAGE) 500 mg tablet Take 500 mg by mouth daily (Patient not taking: Reported on 9/14/2023)     No Known Allergies  Immunization History   Administered Date(s) Administered   • COVID-19 PFIZER VACCINE 0.3 ML IM 03/16/2021, 04/06/2021   • Pneumococcal Conjugate Vaccine 20-valent (Pcv20), Polysace 10/28/2022       Objective     /60 (BP Location: Left arm, Patient Position: Sitting, Cuff Size: Standard)   Pulse 90   Temp 98.7 °F (37.1 °C) (Tympanic)   Resp 18   Ht 5' 1.5" (1.562 m)   Wt 82.6 kg (182 lb)   SpO2 100%   BMI 33.83 kg/m²     Physical Exam  Vitals and nursing note reviewed. Constitutional:       General: She is not in acute distress. Appearance: Normal appearance. She is obese. She is not ill-appearing, toxic-appearing or diaphoretic. Comments: Ambulates with walker   HENT:      Nose: Nose normal.   Eyes:      Conjunctiva/sclera: Conjunctivae normal.   Cardiovascular:      Rate and Rhythm: Normal rate and regular rhythm. Pulses: no weak pulses          Dorsalis pedis pulses are 2+ on the right side and 2+ on the left side. Posterior tibial pulses are 2+ on the right side and 2+ on the left side. Heart sounds: No murmur heard. Pulmonary:      Effort: Pulmonary effort is normal.      Breath sounds: Rales (dry crackles bilateral) present. Abdominal:      General: Abdomen is flat. Bowel sounds are normal.      Palpations: Abdomen is soft. Musculoskeletal:      Cervical back: Normal range of motion and neck supple. Right lower leg: No edema. Left lower leg: No edema. Feet:      Right foot:      Skin integrity: Dry skin present. No ulcer, skin breakdown, erythema, warmth or callus. Left foot:      Skin integrity: Dry skin present. No ulcer, skin breakdown, erythema, warmth or callus. Lymphadenopathy:      Cervical: No cervical adenopathy. Skin:     General: Skin is dry. Findings: No rash. Neurological:      General: No focal deficit present. Mental Status: She is alert and oriented to person, place, and time.    Psychiatric:         Mood and Affect: Mood normal.   Joanie Tapia DO

## 2023-12-13 ENCOUNTER — OFFICE VISIT (OUTPATIENT)
Age: 78
End: 2023-12-13
Payer: COMMERCIAL

## 2023-12-13 VITALS
HEIGHT: 63 IN | HEART RATE: 78 BPM | BODY MASS INDEX: 31.82 KG/M2 | TEMPERATURE: 97.7 F | DIASTOLIC BLOOD PRESSURE: 78 MMHG | SYSTOLIC BLOOD PRESSURE: 118 MMHG | WEIGHT: 179.6 LBS | OXYGEN SATURATION: 94 %

## 2023-12-13 DIAGNOSIS — J84.10 PULMONARY FIBROSIS (HCC): Primary | ICD-10-CM

## 2023-12-13 DIAGNOSIS — J96.11 CHRONIC RESPIRATORY FAILURE WITH HYPOXIA (HCC): ICD-10-CM

## 2023-12-13 PROCEDURE — 99214 OFFICE O/P EST MOD 30 MIN: CPT | Performed by: INTERNAL MEDICINE

## 2023-12-13 NOTE — ASSESSMENT & PLAN NOTE
Patient feels that her symptoms are overall stable and this is confirmed by imaging for at least the past year. She has diffusion impairment and mild restriction on PFTs. We discussed the option of pursuing antifibrotic agent such as Ofev or Esbriet. We discussed the risks and benefits and at this time she would favor holding off. We will plan to repeat PFTs with 6-minute walk test in 6 months and have her follow-up thereafter. Prior to that visit, she will have labs done to complete her ILD workup.

## 2023-12-13 NOTE — PROGRESS NOTES
Pulmonary Follow Up Note   Nolan Longo 77 y.o. female MRN: [id number]  12/13/2023      Assessment/Plan:     Pulmonary fibrosis [facility])  Patient feels that her symptoms are overall stable and this is confirmed by imaging for at least the past year. She has diffusion impairment and mild restriction on PFTs. We discussed the option of pursuing antifibrotic agent such as Ofev or Esbriet. We discussed the risks and benefits and at this time she would favor holding off. We will plan to repeat PFTs with 6-minute walk test in 6 months and have her follow-up thereafter. Prior to that visit, she will have labs done to complete her ILD workup. Return in about 6 months (around 6/13/2024). Visit orders:    Diagnoses and all orders for this visit:    Pulmonary fibrosis ([address])  -     Complete PFT with post Bronchodilator and Six Minute walk; Future  -     RF Screen w/ Reflex to Titer; Future  -     Cyclic citrul peptide antibody, IgG; Future  -     Aldolase; Future  -     ROHIT Screen w/ Reflex to Titer/Pattern; Future  -     Hypersensitivity pneumonitis profile; Future  -     C-reactive protein; Future  -     Aldolase  -     Hypersensitivity pneumonitis profile  -     C-reactive protein    Chronic respiratory failure with hypoxia (HCC)      History of Present Illness   HPI:  Nolan Longo is a 77 y.o. female who is here today for follow-up regarding pulmonary fibrosis, likely IPF. Her overall symptoms are stable. She has some shortness of breath with exertion. She has supplemental oxygen at home but does not use it on a regular basis. She has an occasional wheeze, typically toward the evening hours. She did not have the blood work done that I had ordered after our last visit. Review of Systems   Constitutional:  Negative for chills, fever and unexpected weight change. HENT:  Negative for postnasal drip and sore throat. Eyes:  Negative for visual disturbance.    Respiratory:          As noted in HPI Cardiovascular:  Negative for chest pain. Gastrointestinal:  Negative for abdominal pain, diarrhea and vomiting. Musculoskeletal:  Negative for arthralgias. Skin:  Negative for rash. Neurological:  Negative for headaches. Hematological:  Negative for adenopathy. Psychiatric/Behavioral: Negative. All other systems reviewed and are negative. Medical, Family and Social history reviewed and updated as appropriate    Historical Information   Past Medical History:   Diagnosis Date    Cataract 03/2023    Deep vein thrombosis (HCC)     Diabetes mellitus ([address])     Diverticulitis     Hypertension     Hypothyroid     Osteoarthritis     Pneumonia     Pulmonary fibrosis ([address])      Past Surgical History:   Procedure Laterality Date    CATARACT EXTRACTION      CHOLECYSTECTOMY      SKIN CANCER EXCISION       Family History   Problem Relation Age of Onset    Breast cancer Mother     Heart disease Father        Social History     Tobacco Use   Smoking Status Never   Smokeless Tobacco Never     Meds/Allergies     Current Outpatient Medications:     amLODIPine (NORVASC) 10 mg tablet, Take 10 mg by mouth daily, Disp: , Rfl:     apixaban (Eliquis) 5 mg, Take 5 mg by mouth 1 (one) time, Disp: , Rfl:     atorvastatin (LIPITOR) 20 mg tablet, Take 20 mg by mouth daily, Disp: , Rfl:     escitalopram (LEXAPRO) 20 mg tablet, Take 20 mg by mouth daily at bedtime, Disp: , Rfl:     levothyroxine 112 mcg tablet, Take 112 mcg by mouth daily, Disp: , Rfl:     omeprazole (PriLOSEC) 20 mg delayed release capsule, Take 20 mg by mouth as needed, Disp: , Rfl:     tamsulosin (FLOMAX) 0.4 mg, Take 0.4 mg by mouth daily with dinner, Disp: , Rfl:   No Known Allergies    Vitals: Blood pressure 118/78, pulse 78, temperature 97.7 °F (36.5 °C), temperature source Tympanic, height 5' 3" (1.6 m), weight 81.5 kg (179 lb 9.6 oz), SpO2 94%. Body mass index is 31.81 kg/m².  Oxygen Therapy  SpO2: 94 %    Physical Exam   Physical Exam  Constitutional:       General: She is not in acute distress. Appearance: Normal appearance. HENT:      Head: Normocephalic. Mouth/Throat:      Pharynx: No oropharyngeal exudate. Eyes:      General: No scleral icterus. Neck:      Vascular: No JVD. Cardiovascular:      Rate and Rhythm: Normal rate and regular rhythm. Pulmonary:      Breath sounds: Rales present. No wheezing or rhonchi. Abdominal:      Palpations: Abdomen is soft. Tenderness: There is no abdominal tenderness. Musculoskeletal:         General: No deformity. Cervical back: Neck supple. Lymphadenopathy:      Cervical: No cervical adenopathy. Skin:     General: Skin is warm and dry. Neurological:      Mental Status: She is alert and oriented to person, place, and time. Psychiatric:         Mood and Affect: Mood normal.         Imaging and other studies: I have personally reviewed pertinent reports. and I have personally reviewed pertinent films in PACS CT of the chest performed on 10/30/2023 shows peripheral traction bronchiectasis most notable in basilar and subpleural location. Consistent with typical UIP pattern. Stable compared with January 2023    Pulmonary function testing:  Performed 10/18/2023 and personally interpreted  FEV1/FVC ratio 85%   FEV1 73% predicted  FVC 65% predicted. No response to bronchodilators  TLC 76 % predicted  RV 88 % predicted  DLCO corrected for hemoglobin 27 % predicted.   Mild restriction, severe diffusion impairment

## 2023-12-14 PROBLEM — E03.9 HYPOTHYROIDISM: Status: ACTIVE | Noted: 2023-12-14

## 2023-12-14 PROBLEM — N18.30 CKD (CHRONIC KIDNEY DISEASE) STAGE 3, GFR 30-59 ML/MIN (HCC): Status: ACTIVE | Noted: 2023-12-14

## 2023-12-14 LAB
ALBUMIN SERPL-MCNC: 4.3 G/DL (ref 3.8–4.8)
ALBUMIN/CREAT UR: 48 MG/G CREAT (ref 0–29)
ALBUMIN/GLOB SERPL: 1.5 {RATIO} (ref 1.2–2.2)
ALP SERPL-CCNC: 82 IU/L (ref 44–121)
ALT SERPL-CCNC: 60 IU/L (ref 0–32)
AST SERPL-CCNC: 93 IU/L (ref 0–40)
BASOPHILS # BLD AUTO: 0.1 X10E3/UL (ref 0–0.2)
BASOPHILS NFR BLD AUTO: 1 %
BILIRUB SERPL-MCNC: 0.5 MG/DL (ref 0–1.2)
BUN SERPL-MCNC: 17 MG/DL (ref 8–27)
BUN/CREAT SERPL: 14 (ref 12–28)
CALCIUM SERPL-MCNC: 10 MG/DL (ref 8.7–10.3)
CHLORIDE SERPL-SCNC: 104 MMOL/L (ref 96–106)
CHOLEST SERPL-MCNC: 157 MG/DL (ref 100–199)
CO2 SERPL-SCNC: 16 MMOL/L (ref 20–29)
CREAT SERPL-MCNC: 1.18 MG/DL (ref 0.57–1)
CREAT UR-MCNC: 89.8 MG/DL
EGFR: 47 ML/MIN/1.73
EOSINOPHIL # BLD AUTO: 0.1 X10E3/UL (ref 0–0.4)
EOSINOPHIL NFR BLD AUTO: 2 %
ERYTHROCYTE [DISTWIDTH] IN BLOOD BY AUTOMATED COUNT: 14.5 % (ref 11.7–15.4)
GLOBULIN SER-MCNC: 2.9 G/DL (ref 1.5–4.5)
GLUCOSE SERPL-MCNC: 137 MG/DL (ref 70–99)
HCT VFR BLD AUTO: 39.7 % (ref 34–46.6)
HDLC SERPL-MCNC: 33 MG/DL
HGB BLD-MCNC: 12 G/DL (ref 11.1–15.9)
IMM GRANULOCYTES # BLD: 0 X10E3/UL (ref 0–0.1)
IMM GRANULOCYTES NFR BLD: 0 %
LDLC SERPL CALC-MCNC: 102 MG/DL (ref 0–99)
LDLC/HDLC SERPL: 3.1 RATIO (ref 0–3.2)
LYMPHOCYTES # BLD AUTO: 1.7 X10E3/UL (ref 0.7–3.1)
LYMPHOCYTES NFR BLD AUTO: 20 %
MCH RBC QN AUTO: 26.2 PG (ref 26.6–33)
MCHC RBC AUTO-ENTMCNC: 30.2 G/DL (ref 31.5–35.7)
MCV RBC AUTO: 87 FL (ref 79–97)
MICROALBUMIN UR-MCNC: 43.2 UG/ML
MONOCYTES # BLD AUTO: 1.1 X10E3/UL (ref 0.1–0.9)
MONOCYTES NFR BLD AUTO: 12 %
NEUTROPHILS # BLD AUTO: 5.5 X10E3/UL (ref 1.4–7)
NEUTROPHILS NFR BLD AUTO: 65 %
PLATELET # BLD AUTO: 217 X10E3/UL (ref 150–450)
POTASSIUM SERPL-SCNC: 4.1 MMOL/L (ref 3.5–5.2)
PROT SERPL-MCNC: 7.2 G/DL (ref 6–8.5)
RBC # BLD AUTO: 4.58 X10E6/UL (ref 3.77–5.28)
SL AMB T4, FREE (DIRECT): 1.36 NG/DL (ref 0.82–1.77)
SL AMB VLDL CHOLESTEROL CALC: 22 MG/DL (ref 5–40)
SODIUM SERPL-SCNC: 140 MMOL/L (ref 134–144)
TRIGL SERPL-MCNC: 120 MG/DL (ref 0–149)
TSH SERPL DL<=0.005 MIU/L-ACNC: 4.83 UIU/ML (ref 0.45–4.5)
WBC # BLD AUTO: 8.5 X10E3/UL (ref 3.4–10.8)

## 2023-12-22 PROBLEM — E11.22 TYPE 2 DIABETES MELLITUS WITH CHRONIC KIDNEY DISEASE, WITHOUT LONG-TERM CURRENT USE OF INSULIN (HCC): Status: ACTIVE | Noted: 2023-09-14

## 2023-12-22 PROBLEM — R74.01 TRANSAMINASEMIA: Status: ACTIVE | Noted: 2023-12-22

## 2023-12-27 ENCOUNTER — TELEPHONE (OUTPATIENT)
Dept: FAMILY MEDICINE CLINIC | Facility: CLINIC | Age: 78
End: 2023-12-27

## 2023-12-29 ENCOUNTER — TELEPHONE (OUTPATIENT)
Dept: ADMINISTRATIVE | Facility: OTHER | Age: 78
End: 2023-12-29

## 2023-12-29 ENCOUNTER — OFFICE VISIT (OUTPATIENT)
Dept: FAMILY MEDICINE CLINIC | Facility: CLINIC | Age: 78
End: 2023-12-29
Payer: COMMERCIAL

## 2023-12-29 ENCOUNTER — TELEPHONE (OUTPATIENT)
Dept: FAMILY MEDICINE CLINIC | Facility: CLINIC | Age: 78
End: 2023-12-29

## 2023-12-29 VITALS
OXYGEN SATURATION: 100 % | HEART RATE: 74 BPM | HEIGHT: 62 IN | TEMPERATURE: 95.9 F | RESPIRATION RATE: 18 BRPM | SYSTOLIC BLOOD PRESSURE: 110 MMHG | DIASTOLIC BLOOD PRESSURE: 60 MMHG | BODY MASS INDEX: 33.68 KG/M2 | WEIGHT: 183 LBS

## 2023-12-29 DIAGNOSIS — Z78.0 POST-MENOPAUSAL: ICD-10-CM

## 2023-12-29 DIAGNOSIS — E03.9 HYPOTHYROIDISM, UNSPECIFIED TYPE: ICD-10-CM

## 2023-12-29 DIAGNOSIS — J84.10 PULMONARY FIBROSIS (HCC): ICD-10-CM

## 2023-12-29 DIAGNOSIS — N18.31 STAGE 3A CHRONIC KIDNEY DISEASE (HCC): ICD-10-CM

## 2023-12-29 DIAGNOSIS — Z86.718 HISTORY OF DVT OF LOWER EXTREMITY: ICD-10-CM

## 2023-12-29 DIAGNOSIS — R74.01 TRANSAMINASEMIA: ICD-10-CM

## 2023-12-29 DIAGNOSIS — R32 URINARY INCONTINENCE, UNSPECIFIED TYPE: ICD-10-CM

## 2023-12-29 DIAGNOSIS — M54.50 LOW BACK PAIN POTENTIALLY ASSOCIATED WITH RADICULOPATHY: ICD-10-CM

## 2023-12-29 DIAGNOSIS — E11.22 TYPE 2 DIABETES MELLITUS WITH STAGE 3A CHRONIC KIDNEY DISEASE, WITHOUT LONG-TERM CURRENT USE OF INSULIN (HCC): Primary | ICD-10-CM

## 2023-12-29 DIAGNOSIS — Z00.00 MEDICARE ANNUAL WELLNESS VISIT, SUBSEQUENT: ICD-10-CM

## 2023-12-29 DIAGNOSIS — Z12.31 ENCOUNTER FOR SCREENING MAMMOGRAM FOR MALIGNANT NEOPLASM OF BREAST: ICD-10-CM

## 2023-12-29 DIAGNOSIS — N18.31 TYPE 2 DIABETES MELLITUS WITH STAGE 3A CHRONIC KIDNEY DISEASE, WITHOUT LONG-TERM CURRENT USE OF INSULIN (HCC): Primary | ICD-10-CM

## 2023-12-29 DIAGNOSIS — E78.00 HYPERCHOLESTEROLEMIA: ICD-10-CM

## 2023-12-29 PROBLEM — R26.2 AMBULATORY DYSFUNCTION: Status: ACTIVE | Noted: 2023-12-29

## 2023-12-29 PROCEDURE — G0439 PPPS, SUBSEQ VISIT: HCPCS | Performed by: FAMILY MEDICINE

## 2023-12-29 PROCEDURE — 99214 OFFICE O/P EST MOD 30 MIN: CPT | Performed by: FAMILY MEDICINE

## 2023-12-29 RX ORDER — LEVOTHYROXINE SODIUM 0.12 MG/1
125 TABLET ORAL
Qty: 30 TABLET | Refills: 5 | Status: SHIPPED | OUTPATIENT
Start: 2023-12-29

## 2023-12-29 NOTE — TELEPHONE ENCOUNTER
----- Message from Shawna Aguero sent at 12/29/2023 10:05 AM EST -----  Regarding: DM Eye Exam  12/29/23 10:06 AM    Hello, our patient Kenzie Cali has had a Diabetic Eye Exam completed/performed. Please assist in updating the patient chart by making an External outreach to Dr. Jain at [facility] facility located in [place]. The date of service is this year.    Thank you,  Shawna Aguero  [facility] PRIMARY [facility]ARE

## 2023-12-29 NOTE — LETTER
Diabetic Eye Exam Form 2nd Request!    Date Requested: 24  Patient: Kenzie Cali  Patient : 1945   Referring Provider: Lashonda Flores DO      DIABETIC Eye Exam Date _______________________________      Type of Exam MUST be documented for Diabetic Eye Exams. Please CHECK ONE.     Retinal Exam       Dilated Retinal Exam       OCT       Optomap-Iris Exam      Fundus Photography       Left Eye - Please check Retinopathy or No Retinopathy        Exam did show retinopathy    Exam did not show retinopathy       Right Eye - Please check Retinopathy or No Retinopathy       Exam did show retinopathy    Exam did not show retinopathy       Comments __________________________________________________________    Practice Providing Exam ______________________________________________    Exam Performed By (print name) _______________________________________      Provider Signature ___________________________________________________      These reports are needed for  compliance.  Please fax this completed form and a copy of the Diabetic Eye Exam report to our office located at [address] [place] [address] as soon as possible via Fax [phone] attention Zeynep: Phone [phone]  We thank you for your assistance in treating our mutual patient.

## 2023-12-29 NOTE — TELEPHONE ENCOUNTER
Upon review of the In Basket request and the patient's chart, initial outreach has been made via fax to facility. Please see Contacts section for details.     Thank you  Zeynep Haney

## 2023-12-29 NOTE — LETTER
Diabetic Eye Exam Form     Date Requested: 23  Patient: Kenzie Cali  Patient : 1945   Referring Provider: Lashonda Flores DO      DIABETIC Eye Exam Date _______________________________      Type of Exam MUST be documented for Diabetic Eye Exams. Please CHECK ONE.     Retinal Exam       Dilated Retinal Exam       OCT       Optomap-Iris Exam      Fundus Photography       Left Eye - Please check Retinopathy or No Retinopathy        Exam did show retinopathy    Exam did not show retinopathy       Right Eye - Please check Retinopathy or No Retinopathy       Exam did show retinopathy    Exam did not show retinopathy       Comments __________________________________________________________    Practice Providing Exam ______________________________________________    Exam Performed By (print name) _______________________________________      Provider Signature ___________________________________________________      These reports are needed for  compliance.  Please fax this completed form and a copy of the Diabetic Eye Exam report to our office located at [address] [place] [address] as soon as possible via Fax [phone] attention Zeynep: Phone [phone]  We thank you for your assistance in treating our mutual patient.

## 2023-12-29 NOTE — TELEPHONE ENCOUNTER
Called pharmacy spoke with Choco the pharmacist: Colette Cooley last filled, no refill but picked up script last week. Taking 5mg twice daily.     ----- Message from Lashonda Flores DO sent at 12/29/2023  9:52 AM EST -----  Please call patient's pharmacy to find out sig on her eliquis  Our med module says once daily but it is a bid drug.   Also find out who prescribed it last....

## 2023-12-29 NOTE — PATIENT INSTRUCTIONS
Medicare Preventive Visit Patient Instructions  Thank you for completing your Welcome to Medicare Visit or Medicare Annual Wellness Visit today. Your next wellness visit will be due in one year (12/29/2024).  The screening/preventive services that you may require over the next 5-10 years are detailed below. Some tests may not apply to you based off risk factors and/or age. Screening tests ordered at today's visit but not completed yet may show as past due. Also, please note that scanned in results may not display below.  Preventive Screenings:  Service Recommendations Previous Testing/Comments   Colorectal Cancer Screening  * Colonoscopy    * Fecal Occult Blood Test (FOBT)/Fecal Immunochemical Test (FIT)  * Fecal DNA/Cologuard Test  * Flexible Sigmoidoscopy Age: 45-75 years old   Colonoscopy: every 10 years (may be performed more frequently if at higher risk)  OR  FOBT/FIT: every 1 year  OR  Cologuard: every 3 years  OR  Sigmoidoscopy: every 5 years  Screening may be recommended earlier than age 45 if at higher risk for colorectal cancer. Also, an individualized decision between you and your healthcare provider will decide whether screening between the ages of 76-85 would be appropriate. Colonoscopy: Not on file  FOBT/FIT: Not on file  Cologuard: Not on file  Sigmoidoscopy: Not on file          Breast Cancer Screening Age: 40+ years old  Frequency: every 1-2 years  Not required if history of left and right mastectomy Mammogram: Not on file        Cervical Cancer Screening Between the ages of 21-29, pap smear recommended once every 3 years.   Between the ages of 30-65, can perform pap smear with HPV co-testing every 5 years.   Recommendations may differ for women with a history of total hysterectomy, cervical cancer, or abnormal pap smears in past. Pap Smear: Not on file        Hepatitis C Screening Once for adults born between 1945 and 1965  More frequently in patients at high risk for Hepatitis C Hep C Antibody:  Not on file        Diabetes Screening 1-2 times per year if you're at risk for diabetes or have pre-diabetes Fasting glucose: No results in last 5 years (No results in last 5 years)  A1C: 5.7 (12/6/2023)      Cholesterol Screening Once every 5 years if you don't have a lipid disorder. May order more often based on risk factors. Lipid panel: 12/13/2023          Other Preventive Screenings Covered by Medicare:  Abdominal Aortic Aneurysm (AAA) Screening: covered once if your at risk. You're considered to be at risk if you have a family history of AAA.  Lung Cancer Screening: covers low dose CT scan once per year if you meet all of the following conditions: (1) Age 55-77; (2) No signs or symptoms of lung cancer; (3) Current smoker or have quit smoking within the last 15 years; (4) You have a tobacco smoking history of at least 20 pack years (packs per day multiplied by number of years you smoked); (5) You get a written order from a healthcare provider.  Glaucoma Screening: covered annually if you're considered high risk: (1) You have diabetes OR (2) Family history of glaucoma OR (3)  aged 50 and older OR (4)  American aged 65 and older  Osteoporosis Screening: covered every 2 years if you meet one of the following conditions: (1) You're estrogen deficient and at risk for osteoporosis based off medical history and other findings; (2) Have a vertebral abnormality; (3) On glucocorticoid therapy for more than 3 months; (4) Have primary hyperparathyroidism; (5) On osteoporosis medications and need to assess response to drug therapy.   Last bone density test (DXA Scan): Not on file.  HIV Screening: covered annually if you're between the age of 15-65. Also covered annually if you are younger than 15 and older than 65 with risk factors for HIV infection. For pregnant patients, it is covered up to 3 times per pregnancy.    Immunizations:  Immunization Recommendations   Influenza Vaccine Annual influenza  vaccination during flu season is recommended for all persons aged >= 6 months who do not have contraindications   Pneumococcal Vaccine   * Pneumococcal conjugate vaccine = PCV13 (Prevnar 13), PCV15 (Vaxneuvance), PCV20 (Prevnar 20)  * Pneumococcal polysaccharide vaccine = PPSV23 (Pneumovax) Adults 19-65 yo with certain risk factors or if 65+ yo  If never received any pneumonia vaccine: recommend Prevnar 20 (PCV20)  Give PCV20 if previously received 1 dose of PCV13 or PPSV23   Hepatitis B Vaccine 3 dose series if at intermediate or high risk (ex: diabetes, end stage renal disease, liver disease)   Respiratory syncytial virus (RSV) Vaccine - COVERED BY MEDICARE PART D  * RSVPreF3 (Arexvy) CDC recommends that adults 60 years of age and older may receive a single dose of RSV vaccine using shared clinical decision-making (SCDM)   Tetanus (Td) Vaccine - COST NOT COVERED BY MEDICARE PART B Following completion of primary series, a booster dose should be given every 10 years to maintain immunity against tetanus. Td may also be given as tetanus wound prophylaxis.   Tdap Vaccine - COST NOT COVERED BY MEDICARE PART B Recommended at least once for all adults. For pregnant patients, recommended with each pregnancy.   Shingles Vaccine (Shingrix) - COST NOT COVERED BY MEDICARE PART B  2 shot series recommended in those 19 years and older who have or will have weakened immune systems or those 50 years and older     Health Maintenance Due:      Topic Date Due    Hepatitis C Screening  12/06/2024 (Originally 1945)     Immunizations Due:      Topic Date Due    COVID-19 Vaccine (3 - 2023-24 season) 09/01/2023     Advance Directives   What are advance directives?  Advance directives are legal documents that state your wishes and plans for medical care. These plans are made ahead of time in case you lose your ability to make decisions for yourself. Advance directives can apply to any medical decision, such as the treatments you want,  and if you want to donate organs.   What are the types of advance directives?  There are many types of advance directives, and each state has rules about how to use them. You may choose a combination of any of the following:  Living will:  This is a written record of the treatment you want. You can also choose which treatments you do not want, which to limit, and which to stop at a certain time. This includes surgery, medicine, IV fluid, and tube feedings.   Durable power of  for healthcare (DPAHC):  This is a written record that states who you want to make healthcare choices for you when you are unable to make them for yourself. This person, called a proxy, is usually a family member or a friend. You may choose more than 1 proxy.  Do not resuscitate (DNR) order:  A DNR order is used in case your heart stops beating or you stop breathing. It is a request not to have certain forms of treatment, such as CPR. A DNR order may be included in other types of advance directives.  Medical directive:  This covers the care that you want if you are in a coma, near death, or unable to make decisions for yourself. You can list the treatments you want for each condition. Treatment may include pain medicine, surgery, blood transfusions, dialysis, IV or tube feedings, and a ventilator (breathing machine).  Values history:  This document has questions about your views, beliefs, and how you feel and think about life. This information can help others choose the care that you would choose.  Why are advance directives important?  An advance directive helps you control your care. Although spoken wishes may be used, it is better to have your wishes written down. Spoken wishes can be misunderstood, or not followed. Treatments may be given even if you do not want them. An advance directive may make it easier for your family to make difficult choices about your care.   Weight Management   Why it is important to manage your weight:   Being overweight increases your risk of health conditions such as heart disease, high blood pressure, type 2 diabetes, and certain types of cancer. It can also increase your risk for osteoarthritis, sleep apnea, and other respiratory problems. Aim for a slow, steady weight loss. Even a small amount of weight loss can lower your risk of health problems.  How to lose weight safely:  A safe and healthy way to lose weight is to eat fewer calories and get regular exercise. You can lose up about 1 pound a week by decreasing the number of calories you eat by 500 calories each day.   Healthy meal plan for weight management:  A healthy meal plan includes a variety of foods, contains fewer calories, and helps you stay healthy. A healthy meal plan includes the following:  Eat whole-grain foods more often.  A healthy meal plan should contain fiber. Fiber is the part of grains, fruits, and vegetables that is not broken down by your body. Whole-grain foods are healthy and provide extra fiber in your diet. Some examples of whole-grain foods are whole-wheat breads and pastas, oatmeal, brown rice, and bulgur.  Eat a variety of vegetables every day.  Include dark, leafy greens such as spinach, kale, lance greens, and mustard greens. Eat yellow and orange vegetables such as carrots, sweet potatoes, and winter squash.   Eat a variety of fruits every day.  Choose fresh or canned fruit (canned in its own juice or light syrup) instead of juice. Fruit juice has very little or no fiber.  Eat low-fat dairy foods.  Drink fat-free (skim) milk or 1% milk. Eat fat-free yogurt and low-fat cottage cheese. Try low-fat cheeses such as mozzarella and other reduced-fat cheeses.  Choose meat and other protein foods that are low in fat.  Choose beans or other legumes such as split peas or lentils. Choose fish, skinless poultry (chicken or turkey), or lean cuts of red meat (beef or pork). Before you cook meat or poultry, cut off any visible fat.   Use  less fat and oil.  Try baking foods instead of frying them. Add less fat, such as margarine, sour cream, regular salad dressing and mayonnaise to foods. Eat fewer high-fat foods. Some examples of high-fat foods include french fries, doughnuts, ice cream, and cakes.  Eat fewer sweets.  Limit foods and drinks that are high in sugar. This includes candy, cookies, regular soda, and sweetened drinks.  Exercise:  Exercise at least 30 minutes per day on most days of the week. Some examples of exercise include walking, biking, dancing, and swimming. You can also fit in more physical activity by taking the stairs instead of the elevator or parking farther away from stores. Ask your healthcare provider about the best exercise plan for you.      © Copyright [employer] 2018 Information is for End User's use only and may not be sold, redistributed or otherwise used for commercial purposes. All illustrations and images included in CareNotes® are the copyrighted property of [employer] or [employer]      Please increase your levothyroxine from 112 to 125 mcg once daily. Please take on an empty stomach with no other medications  Repeat thyroid labs and other labs to further evaluate your liver enzyme elevation in about 6 weeks.   Get US liver done.   Get xray of your back done and schedule PT

## 2023-12-29 NOTE — PROGRESS NOTES
" Assessment and Plan:     Problem List Items Addressed This Visit          Endocrine    Type 2 diabetes mellitus with chronic kidney disease, without long-term current use of insulin ([facility]) -   Lab Results   Component Value Date    HGBA1C 5.7 12/06/2023   Steroid induced.   On no medications.   reviewed her labs.   She has seen eye doctor in last year. Sees ashutosh lamb at [facility]  Foot exam completed at her last visit on 12/6/3    Hypothyroidism    Relevant Medications    levothyroxine (Levoxyl) 125 mcg tablet    Other Relevant Orders    TSH, 3rd generation with Free T4 reflex  TSH elevated, but Free T4 normal on recent labs.   Since she is so fatigued, will increase her levothyroxine from 112 to 125 mcg daily and recheck labs in about 6 weeks.   She was advised to take on empty stomach with no other medications.   Will call her with results.        Respiratory    Pulmonary fibrosis ([facility])  Patient follows with pulmonology. Reviewed most recent consultation.   Her O2 sat in office today was 100% though visibly short of breath with ambulation.        Genitourinary    CKD (chronic kidney disease) stage 3, GFR 30-59 ml/min ([facility])  Reviewed labs with patient       Other    History of DVT of lower extremity  Patient on eliquis 5 mg.   Patient does not know how she is taking this and we have not received records from previous PCP. Would assume she is taking this BID but will have my staff call pharmacy to find out what sig says and who was last to prescribe this.     Hypercholesterolemia  Lab Results   Component Value Date    CHOLESTEROL 157 12/13/2023     Lab Results   Component Value Date    HDL 33 (L) 12/13/2023     Lab Results   Component Value Date    TRIG 120 12/13/2023     No results found for: \"NONHDLC\"  Reviewed labs. She is taking and tolerating atorvastatin 20 mg    Transaminasemia    Relevant Orders    US abdomen limited    Protein electrophoresis, serum    Ceruloplasmin    Alpha-1-antitrypsin    " Gamma GT    Iron Panel (Includes Ferritin, Iron Sat%, Iron, and TIBC)    Hepatitis panel, acute    Anti-smooth muscle antibody, IgG      Reviewed labs. Her AST/ALT were elevated at 93/60 respectively. No prior records so not sure if this is chronic or something new.   She does not drink alcohol and is not taking any tylenol or other meds that would be contributing.   Will get US of abdomen to look for fatty liver.   ROHIT was ordered by her pulmonologist.   Will check some other labs when she goes for repeat TSH in 6 weeks.      Other Visit Diagnoses       Medicare annual wellness visit, subsequent        Encounter for screening mammogram for malignant neoplasm of breast        Relevant Orders    Mammo screening bilateral w cad    Post-menopausal        Relevant Orders    DXA bone density spine hip and pelvis             Preventive health issues were discussed with patient, and age appropriate screening tests were ordered as noted in patient's After Visit Summary.  Personalized health advice and appropriate referrals for health education or preventive services given if needed, as noted in patient's After Visit Summary.     History of Present Illness:     Patient with chronic problems as noted below presents for a Medicare Wellness Visit and for review of recent labs completed on 12/13/23.     She was seen here as a new patient on 12/6/23 to establish care.    Was complaining of low back pain. Previously saw pain management for this and had what sounds like epidurals. Was referred for PT and xray of lumbar spine was ordered at time of her initial visit here on 12/6/23. She has difficulty with transportation in that she does not drive and relies on her care taker who comes in once a week to take her to appointments so she has not scheduled xray or PT.     HPI   Patient Care Team:  Lashonda Flores DO as PCP - General (Family Medicine)  Colette Cooley DO as PCP - PCP-[facility] ([facility])     Review of Systems:     Review  "of Systems     Problem List:     Patient Active Problem List   Diagnosis   • Pulmonary fibrosis (HCC)   • Type 2 diabetes mellitus with chronic kidney disease, without long-term current use of insulin (HCC)--steroid induced and currently on no medications for this.   Last A1c on 12/6/23 was 5.7  Her eye exam is overdue  Foot exam completed on 12/6/23.    • History of DVT of lower extremity   • ROHIT positive   • Anticoagulated   • Diverticulosis   • History of pulmonary embolism--patient on eliquis for her DVT/PE history.    • Hypercholesterolemia   • Interstitial lung disease (HCC)--patient follows with pulmonology at [facility] (Dr Marroquin). Last visit with her was on 12/13/23. Stable.  had diffusion impairment and mild restriction on PFTs. Will be following up in 6 months.    • Obesity   • Current moderate episode of major depressive disorder without prior episode (HCC)--patient on lexapro and doing well on this.   Her PHQ9 score today was \"0\"     • Osteoarthritis of both knees   • Low back pain potentially associated with radiculopathy   • Chronic respiratory failure with hypoxia ([facility])   • Hypothyroidism--  Lab Results   Component Value Date    TSH 4.830 (H) 12/13/2023   Free T4 1.36  Is feeling tired. Stating that if someone would let her sleep all day, she would.   She is on levothyroxine 112 mcg daily.    • CKD (chronic kidney disease) stage 3, GFR 30-59 ml/min ([facility])--stable   • Transaminasemia  Lab Results   Component Value Date    SODIUM 140 12/13/2023    K 4.1 12/13/2023     12/13/2023    CO2 16 (L) 12/13/2023    BUN 17 12/13/2023    CREATININE 1.18 (H) 12/13/2023    GLUC 137 (H) 12/13/2023    AST 93 (H) 12/13/2023    ALT 60 (H) 12/13/2023    TP 7.2 12/13/2023    TBILI 0.5 12/13/2023    EGFR 47 (L) 12/13/2023         Past Medical and Surgical History:     Past Medical History:   Diagnosis Date   • Cataract 03/2023   • Deep vein thrombosis (HCC)    • Diabetes mellitus (HCC)    • Diverticulitis    • " Hypertension    • Hypothyroid    • Osteoarthritis    • Pneumonia    • Pulmonary fibrosis (HCC)      Past Surgical History:   Procedure Laterality Date   • CATARACT EXTRACTION     • CHOLECYSTECTOMY     • SKIN CANCER EXCISION        Family History:     Family History   Problem Relation Age of Onset   • Breast cancer Mother    • Heart disease Father       Social History:     Social History     Socioeconomic History   • Marital status: /Civil Union     Spouse name: None   • Number of children: None   • Years of education: None   • Highest education level: None   Occupational History   • None   Tobacco Use   • Smoking status: Never   • Smokeless tobacco: Never   Vaping Use   • Vaping status: Never Used   Substance and Sexual Activity   • Alcohol use: Not Currently   • Drug use: Never   • Sexual activity: Not Currently     Partners: Male   Other Topics Concern   • None   Social History Narrative     since 1979    Has son and daughter     Lives alone in an apartment     Social Determinants of Health     Financial Resource Strain: Not on file   Food Insecurity: Not on file   Transportation Needs: Not on file   Physical Activity: Not on file   Stress: Not on file   Social Connections: Not on file   Intimate Partner Violence: Not on file   Housing Stability: Not on file      Medications and Allergies:     Current Outpatient Medications   Medication Sig Dispense Refill   • amLODIPine (NORVASC) 10 mg tablet Take 10 mg by mouth daily     • apixaban (Eliquis) 5 mg Take 5 mg by mouth 1 (one) time     • atorvastatin (LIPITOR) 20 mg tablet Take 20 mg by mouth daily     • escitalopram (LEXAPRO) 20 mg tablet Take 20 mg by mouth daily at bedtime     • levothyroxine (Levoxyl) 125 mcg tablet Take 1 tablet (125 mcg total) by mouth daily in the early morning 30 tablet 5   • omeprazole (PriLOSEC) 20 mg delayed release capsule Take 20 mg by mouth as needed     • tamsulosin (FLOMAX) 0.4 mg Take 0.4 mg by mouth daily with dinner        No current facility-administered medications for this visit.     No Known Allergies   Immunizations:     Immunization History   Administered Date(s) Administered   • COVID-19 PFIZER VACCINE 0.3 ML IM 03/16/2021, 04/06/2021   • Pneumococcal Conjugate Vaccine 20-valent (Pcv20), Polysace 10/28/2022   • Pneumococcal Polysaccharide PPV23 09/01/2021   • Td (adult), adsorbed 07/18/2022      Health Maintenance:         Topic Date Due   • Hepatitis C Screening  12/06/2024 (Originally 1945)         Topic Date Due   • COVID-19 Vaccine (3 - 2023-24 season) 09/01/2023      Medicare Screening Tests and Risk Assessments:     Kenzie is here for her Subsequent Wellness visit.     Health Risk Assessment:   Patient rates overall health as poor. Patient feels that their physical health rating is slightly worse. Patient is satisfied with their life. Eyesight was rated as same. Hearing was rated as much worse. Patient feels that their emotional and mental health rating is same. Patients states they are never, rarely angry. Patient states they are always unusually tired/fatigued. Pain experienced in the last 7 days has been some. Patient's pain rating has been 8/10. Patient states that she has experienced no weight loss or gain in last 6 months.     Depression Screening:   PHQ-9 Score: 0      Fall Risk Screening:   In the past year, patient has experienced: no history of falling in past year      Urinary Incontinence Screening:   Patient has leaked urine accidently in the last six months.     Home Safety:  Patient has trouble with stairs inside or outside of their home. Patient has working smoke alarms and has working carbon monoxide detector. Home safety hazards include: none. Uses her rolling walker to get around but notes that in the home, can go about 15 steps to the bathroom without walker.   Has caretaker, Abigail, who comes on Wednesdays and can take her to appointments.     Nutrition:   Current diet is Regular. 3 eggs  for lunch; sometimes does not eat lunch. Dinner=mom's meals which get delivered    Medications:   Patient is currently taking over-the-counter supplements. OTC medications include: see medication list. Patient is able to manage medications.     Activities of Daily Living (ADLs)/Instrumental Activities of Daily Living (IADLs):   Walk and transfer into and out of bed and chair?: Yes  Dress and groom yourself?: Yes    Bathe or shower yourself?: Yes    Feed yourself? Yes  Do your laundry/housekeeping?: Yes  Manage your money, pay your bills and track your expenses?: Yes  Make your own meals?: Yes    Do your own shopping?: Yes    Previous Hospitalizations:   Any hospitalizations or ED visits within the last 12 months?: No      Advance Care Planning:   Living will: No    Durable POA for healthcare: No    Advanced directive: Yes    Advanced directive counseling given: Yes    ACP document given: Yes      Cognitive Screening:   Provider or family/friend/caregiver concerned regarding cognition?: No    PREVENTIVE SCREENINGS      Cardiovascular Screening:    General: Screening Not Indicated and History Lipid Disorder      Diabetes Screening:     General: Screening Not Indicated and History Diabetes      Colorectal Cancer Screening:     General: Screening Not Indicated      Breast Cancer Screening:     General: Risks and Benefits Discussed    Due for: Mammogram        Cervical Cancer Screening:    General: Screening Not Indicated      Osteoporosis Screening:    General: Risks and Benefits Discussed    Due for: DXA Axial      Abdominal Aortic Aneurysm (AAA) Screening:        General: Screening Not Indicated      Lung Cancer Screening:     General: Screening Not Indicated      Hepatitis C Screening:    General: Screening Current      Preventive Screening Comments: Thinks she had colonoscopy in last 10 years.   Not sure when last mammogram was done. Would like to get a mammogram.   Has been awhile since dexa and is agreeable to  "this.     Screening, Brief Intervention, and Referral to Treatment (SBIRT)    Screening  Typical number of drinks in a day: 0    AUDIT-C Screenin) How often did you have a drink containing alcohol in the past year? never  2) How many drinks did you have on a typical day when you were drinking in the past year? 0  3) How often did you have 6 or more drinks on one occasion in the past year? never    AUDIT-C Score: 0  Interpretation: Score 0-2 (female): Negative screen for alcohol misuse    Single Item Drug Screening:  How often have you used an illegal drug (including marijuana) or a prescription medication for non-medical reasons in the past year? never    Single Item Drug Screen Score: 0  Interpretation: Negative screen for possible drug use disorder    Brief Intervention  Alcohol & drug use screenings were reviewed. No concerns regarding substance use disorder identified.     Other Counseling Topics:   Calcium and vitamin D intake.     No results found.     Physical Exam:     /60 (BP Location: Left arm, Patient Position: Sitting, Cuff Size: Standard)   Pulse 74   Temp (!) 95.9 °F (35.5 °C) (Tympanic)   Resp 18   Ht 5' 2\" (1.575 m)   Wt 83 kg (183 lb)   SpO2 100%   BMI 33.47 kg/m²     Physical Exam  Vitals and nursing note reviewed.   Constitutional:       General: She is not in acute distress.     Appearance: Normal appearance. She is obese. She is not ill-appearing, toxic-appearing or diaphoretic.      Comments: Ambulate with rolling walker   HENT:      Head: Normocephalic and atraumatic.   Neck:      Vascular: No carotid bruit.   Cardiovascular:      Rate and Rhythm: Normal rate and regular rhythm.   Pulmonary:      Comments: Dry crackles on exam  Increased work of breathing with ambulation  Abdominal:      General: Abdomen is flat. Bowel sounds are normal.      Palpations: Abdomen is soft.   Musculoskeletal:      Cervical back: Normal range of motion and neck supple.      Right lower leg: No " edema.      Left lower leg: No edema.   Lymphadenopathy:      Cervical: No cervical adenopathy.   Neurological:      General: No focal deficit present.      Mental Status: She is alert and oriented to person, place, and time.      Gait: Gait abnormal.        Lashonda Flores DO

## 2024-01-05 NOTE — TELEPHONE ENCOUNTER
As a follow-up, a second attempt has been made for outreach via fax to facility. Please see Contacts section for details.    Thank you  Zeynep Haney

## 2024-01-08 NOTE — TELEPHONE ENCOUNTER
Upon review of the In Basket request we were able to locate, review, and update the patient chart as requested for Diabetic Eye Exam.    Any additional questions or concerns should be emailed to the Practice Liaisons via the appropriate education email address, please do not reply via In Basket.    Thank you  Zeynep Haney

## 2024-01-17 ENCOUNTER — HOSPITAL ENCOUNTER (OUTPATIENT)
Dept: BONE DENSITY | Facility: IMAGING CENTER | Age: 79
Discharge: HOME | End: 2024-01-17
Payer: COMMERCIAL

## 2024-01-17 VITALS — BODY MASS INDEX: 33.68 KG/M2 | WEIGHT: 178.4 LBS | HEIGHT: 61 IN

## 2024-01-17 DIAGNOSIS — Z78.0 POST-MENOPAUSAL: ICD-10-CM

## 2024-01-17 LAB
A1AT SERPL-MCNC: 176 MG/DL (ref 101–187)
ACTIN IGG SERPL-ACNC: 15 UNITS (ref 0–19)
ALBUMIN SERPL ELPH-MCNC: 3.5 G/DL (ref 2.9–4.4)
ALBUMIN/GLOB SERPL: 1 {RATIO} (ref 0.7–1.7)
ALPHA1 GLOB SERPL ELPH-MCNC: 0.3 G/DL (ref 0–0.4)
ALPHA2 GLOB SERPL ELPH-MCNC: 0.7 G/DL (ref 0.4–1)
B-GLOBULIN SERPL ELPH-MCNC: 1.3 G/DL (ref 0.7–1.3)
CERULOPLASMIN SERPL-MCNC: 29.7 MG/DL (ref 19–39)
GAMMA GLOB SERPL ELPH-MCNC: 1.4 G/DL (ref 0.4–1.8)
GGT SERPL-CCNC: 78 IU/L (ref 0–60)
GLOBULIN SER CALC-MCNC: 3.6 G/DL (ref 2.2–3.9)
LABORATORY COMMENT REPORT: NORMAL
M PROTEIN SERPL ELPH-MCNC: NORMAL G/DL
PROT SERPL-MCNC: 7.1 G/DL (ref 6–8.5)
SL AMB T4, FREE (DIRECT): 1.41 NG/DL (ref 0.82–1.77)
TSH SERPL DL<=0.005 MIU/L-ACNC: 4.93 UIU/ML (ref 0.45–4.5)

## 2024-01-17 PROCEDURE — 77080 DXA BONE DENSITY AXIAL: CPT

## 2024-01-18 ENCOUNTER — TELEPHONE (OUTPATIENT)
Dept: FAMILY MEDICINE CLINIC | Facility: CLINIC | Age: 79
End: 2024-01-18

## 2024-01-18 DIAGNOSIS — E03.9 HYPOTHYROIDISM, UNSPECIFIED TYPE: Primary | ICD-10-CM

## 2024-01-18 DIAGNOSIS — R74.01 TRANSAMINASEMIA: ICD-10-CM

## 2024-01-18 PROBLEM — M81.0 AGE-RELATED OSTEOPOROSIS WITHOUT CURRENT PATHOLOGICAL FRACTURE: Status: ACTIVE | Noted: 2024-01-18

## 2024-01-18 RX ORDER — LEVOTHYROXINE SODIUM 0.15 MG/1
150 TABLET ORAL
Qty: 30 TABLET | Refills: 5 | Status: SHIPPED | OUTPATIENT
Start: 2024-01-18

## 2024-01-18 NOTE — TELEPHONE ENCOUNTER
----- Message from Lashonda Flores, DO sent at 1/18/2024  9:36 AM EST -----  Let patient know that her lab testing showed that thyroid was slightly underactive for which I recommend an increase in her synthroid dose.   Will send rx to pharmacy for new dose and have her repeat tsh in 8 weeks. The other labs looked okay other than the GGT (another liver enzyme ) being elevated.   Await the ultrasound.   Also lab did not do the iron studies which I will re-order. Can get this done when she goes for repeat tsh in 8 weeks.

## 2024-01-18 NOTE — TELEPHONE ENCOUNTER
----- Message from Lashonda Flores DO sent at 1/18/2024 11:23 AM EST -----  Please call patient to let her know that her dexa scan showed osteoporosis.   Recommend non-urgent appt to discuss treatment options.

## 2024-01-18 NOTE — TELEPHONE ENCOUNTER
Patient is aware of the osteoporosis.     She sounded pretty upset     Follow up regarding results is scheduled

## 2024-01-21 ENCOUNTER — HOSPITAL ENCOUNTER (OUTPATIENT)
Dept: ULTRASOUND IMAGING | Facility: HOSPITAL | Age: 79
Discharge: HOME | End: 2024-01-21
Payer: COMMERCIAL

## 2024-01-21 ENCOUNTER — HOSPITAL ENCOUNTER (OUTPATIENT)
Dept: RADIOLOGY | Facility: HOSPITAL | Age: 79
Discharge: HOME | End: 2024-01-21
Payer: COMMERCIAL

## 2024-01-21 DIAGNOSIS — M54.50 LOW BACK PAIN POTENTIALLY ASSOCIATED WITH RADICULOPATHY: ICD-10-CM

## 2024-01-21 DIAGNOSIS — R74.01 TRANSAMINASEMIA: ICD-10-CM

## 2024-01-21 PROCEDURE — 76705 ECHO EXAM OF ABDOMEN: CPT

## 2024-01-21 PROCEDURE — 72110 X-RAY EXAM L-2 SPINE 4/>VWS: CPT

## 2024-01-29 ENCOUNTER — TELEPHONE (OUTPATIENT)
Dept: FAMILY MEDICINE CLINIC | Facility: CLINIC | Age: 79
End: 2024-01-29

## 2024-01-29 PROBLEM — K76.0 FATTY LIVER: Status: ACTIVE | Noted: 2024-01-29

## 2024-01-29 NOTE — TELEPHONE ENCOUNTER
----- Message from Lashonda Flores DO sent at 1/29/2024 12:10 PM EST -----  Can let patient know that her ultrasound showed fatty liver. Otherwise normal.

## 2024-02-08 PROBLEM — I25.10 CORONARY ATHEROSCLEROSIS: Status: ACTIVE | Noted: 2024-02-08

## 2024-02-08 PROBLEM — I70.0 ATHEROSCLEROSIS OF AORTA (HCC): Status: ACTIVE | Noted: 2024-02-08

## 2024-02-23 NOTE — PROGRESS NOTES
Name: Kenzie Cali      : 1945      MRN: [id number]  Encounter Provider: Lashonda Flores DO  Encounter Date: 2024   Encounter department: [facility]    Assessment & Plan     1. Age-related osteoporosis without current pathological fracture  -     Comprehensive metabolic panel; Future  -     PTH, intact; Future  -     Vitamin D 25 hydroxy; Future  -     Protein electrophoresis, serum; Future  -     Calcium, urine, 24 hour; Future  -     Celiac Disease Antibody Profile; Future  -     C-Telopeptide; Future  -     Comprehensive metabolic panel  -     PTH, intact  -     Vitamin D 25 hydroxy  -     Protein electrophoresis, serum  -     Celiac Disease Antibody Profile  -     C-Telopeptide  -     denosumab (PROLIA) 60 mg/mL; Inject 1 mL (60 mg total) under the skin once for 1 dose    Patient with t-score of -2.8 on her recent dexa scan.   We discussed importance of vitamin D, calcium and weight bearing exercise.   She is not able to use fosamax as she lies in bed most of the day   Recommend prolia every 6 moths.   Check labs as above prior to starting prolia. Will schedule nurse visit for this once she gets the medication.     2. Fatty liver  Reviewed US   Recommend diet/weight loss  3. Transaminasemia  Likely secondary to her fatty liver.   We reviewed labs done for workup.   4. Hypothyroidism, unspecified type  Lab Results   Component Value Date    TSH 4.930 (H) 01/15/2024     Her synthroid dose was increased to 150 mcg and she is to have repeat tsh in march along with the labs ordered for workup of her osteoporosis.   Will call with results.   5. Stage 3a chronic kidney disease (HCC)  stable  6. Type 2 diabetes mellitus with stage 3a chronic kidney disease, without long-term current use of insulin (HCC)  Lab Results   Component Value Date    HGBA1C 5.7 2023       7. Chronic respiratory failure with hypoxia (HCC)  Follows with pulmonology. Reviewed consultation letter  8.  "Interstitial lung disease (HCC)  Follows with pulmonology  9. Pulmonary fibrosis (HCC)  Sees pulmonology  10. Atherosclerosis of aorta (HCC)  On recent CT scan  On statin therapy  11. Current moderate episode of major depressive disorder without prior episode (HCC)  Stable on lexapro.          Subjective     HPI  Patient is a 78 year old female with hyperlipidemia, DM2, hypothyroidism, fatty liver, interstitial lung disease, coronary atherosclerosis, CKD, history PE, depression, and ambulatory dysfunction who is here today to discuss her recent dexa scan report and to review labs done in January.     She is accompanied to today's visit by her son.     She had labs done in January  for further evaluation of her elevated liver enzymes. Lab workup unremarkable though iron studies and viral hepatitis panel never completed. Her US showed fatty infiltration of the liver.    Her TSH was slightly elevated on labs done in January and her synthroid dose was increased and she was advised that she is to have repeat TSH done in March.    T-score in femoral neck was -2.8 on recent dexa.   Her 10 year risk of hip fracture is 12% with the 10 year risk of major osteoporotic fracture being 29% as calculated by the [employer] fracture risk assessment tool. She denies any fractures. She states that she spends most of her day in bed. Sometimes caretaker will help to get her up.     Saw pulmonology (Dr. Chaves) in December who noted that her pulmonary fibrosis was stable. Per that consultation letter \"She has diffusion impairment and mild restriction on PFTs. We discussed the option of pursuing antifibrotic agent such as Ofev or Esbriet. We discussed the risks and benefits and at this time she would favor holding off. We will plan to repeat PFTs with 6-minute walk test in 6 months and have her follow-up thereafter. Prior to that visit, she will have labs done to complete her ILD workup \"    Today, tells me that her " stools have changed in appearance. Stools are soft and mushy and are the color of [place]'s mustard. Leaks some stool. No abdominal pain. This has been going on for a few weeks. No recent antibiotic use. No recent travel. She declines referral to see GI. States that she thinks it is just something she is eating. On further questioning, notes that not all stools are soft. She does have some stools that are normal color, caliber and formed.     Seeing derm ([facility]) for squamous cell cancer on her leg. No notes for review.   Review of Systems    Past Medical History:   Diagnosis Date   • Cataract 03/2023   • Deep vein thrombosis (HCC)    • Diabetes mellitus (HCC)    • Diverticulitis    • Hypertension    • Hypothyroid    • Osteoarthritis    • Pneumonia    • Pulmonary fibrosis (HCC)      Past Surgical History:   Procedure Laterality Date   • CATARACT EXTRACTION     • CHOLECYSTECTOMY     • SKIN CANCER EXCISION       Family History   Problem Relation Age of Onset   • Breast cancer Mother    • Heart disease Father      Social History     Socioeconomic History   • Marital status: /Civil Union     Spouse name: None   • Number of children: None   • Years of education: None   • Highest education level: None   Occupational History   • None   Tobacco Use   • Smoking status: Never   • Smokeless tobacco: Never   Vaping Use   • Vaping status: Never Used   Substance and Sexual Activity   • Alcohol use: Not Currently   • Drug use: Never   • Sexual activity: Not Currently     Partners: Male   Other Topics Concern   • None   Social History Narrative     since 1979    Has son and daughter     Lives alone in an apartment     Social Determinants of Health     Financial Resource Strain: Not on file   Food Insecurity: Not on file   Transportation Needs: Not on file   Physical Activity: Not on file   Stress: Not on file   Social Connections: Not on file   Intimate Partner Violence: Not on file   Housing Stability: Not  "on file     Current Outpatient Medications on File Prior to Visit   Medication Sig   • amLODIPine (NORVASC) 10 mg tablet Take 10 mg by mouth daily   • apixaban (Eliquis) 5 mg Take 5 mg by mouth 2 (two) times a day   • atorvastatin (LIPITOR) 20 mg tablet Take 20 mg by mouth daily   • escitalopram (LEXAPRO) 20 mg tablet Take 20 mg by mouth daily at bedtime   • levothyroxine (Synthroid) 150 mcg tablet Take 1 tablet (150 mcg total) by mouth daily in the early morning   • omeprazole (PriLOSEC) 20 mg delayed release capsule Take 20 mg by mouth as needed   • tamsulosin (FLOMAX) 0.4 mg Take 0.4 mg by mouth daily with dinner     No Known Allergies  Immunization History   Administered Date(s) Administered   • COVID-19 PFIZER VACCINE 0.3 ML IM 03/16/2021, 04/06/2021   • Pneumococcal Conjugate Vaccine 20-valent (Pcv20), Polysace 10/28/2022   • Pneumococcal Polysaccharide PPV23 09/01/2021   • Td (adult), adsorbed 07/18/2022       Objective     /50 (BP Location: Left arm, Patient Position: Sitting, Cuff Size: Adult)   Pulse 82   Temp 99.5 °F (37.5 °C) (Tympanic)   Ht 5' 0.5\" (1.537 m)   Wt 79.8 kg (176 lb)   SpO2 98%   BMI 33.81 kg/m²     Physical Exam  Lashonda Flores,     "

## 2024-02-26 ENCOUNTER — OFFICE VISIT (OUTPATIENT)
Dept: FAMILY MEDICINE CLINIC | Facility: CLINIC | Age: 79
End: 2024-02-26
Payer: COMMERCIAL

## 2024-02-26 VITALS
DIASTOLIC BLOOD PRESSURE: 50 MMHG | SYSTOLIC BLOOD PRESSURE: 124 MMHG | WEIGHT: 176 LBS | HEIGHT: 61 IN | TEMPERATURE: 99.5 F | HEART RATE: 82 BPM | BODY MASS INDEX: 33.23 KG/M2 | OXYGEN SATURATION: 98 %

## 2024-02-26 DIAGNOSIS — E11.22 TYPE 2 DIABETES MELLITUS WITH STAGE 3A CHRONIC KIDNEY DISEASE, WITHOUT LONG-TERM CURRENT USE OF INSULIN (HCC): ICD-10-CM

## 2024-02-26 DIAGNOSIS — K76.0 FATTY LIVER: ICD-10-CM

## 2024-02-26 DIAGNOSIS — J96.11 CHRONIC RESPIRATORY FAILURE WITH HYPOXIA (HCC): ICD-10-CM

## 2024-02-26 DIAGNOSIS — N18.31 STAGE 3A CHRONIC KIDNEY DISEASE (HCC): ICD-10-CM

## 2024-02-26 DIAGNOSIS — J84.10 PULMONARY FIBROSIS (HCC): ICD-10-CM

## 2024-02-26 DIAGNOSIS — E03.9 HYPOTHYROIDISM, UNSPECIFIED TYPE: ICD-10-CM

## 2024-02-26 DIAGNOSIS — R74.01 TRANSAMINASEMIA: ICD-10-CM

## 2024-02-26 DIAGNOSIS — I70.0 ATHEROSCLEROSIS OF AORTA (HCC): ICD-10-CM

## 2024-02-26 DIAGNOSIS — N18.31 TYPE 2 DIABETES MELLITUS WITH STAGE 3A CHRONIC KIDNEY DISEASE, WITHOUT LONG-TERM CURRENT USE OF INSULIN (HCC): ICD-10-CM

## 2024-02-26 DIAGNOSIS — F32.1 CURRENT MODERATE EPISODE OF MAJOR DEPRESSIVE DISORDER WITHOUT PRIOR EPISODE (HCC): ICD-10-CM

## 2024-02-26 DIAGNOSIS — M81.0 AGE-RELATED OSTEOPOROSIS WITHOUT CURRENT PATHOLOGICAL FRACTURE: Primary | ICD-10-CM

## 2024-02-26 DIAGNOSIS — J84.9 INTERSTITIAL LUNG DISEASE (HCC): ICD-10-CM

## 2024-02-26 PROCEDURE — 99214 OFFICE O/P EST MOD 30 MIN: CPT | Performed by: FAMILY MEDICINE

## 2024-02-29 ENCOUNTER — TELEPHONE (OUTPATIENT)
Dept: FAMILY MEDICINE CLINIC | Facility: CLINIC | Age: 79
End: 2024-02-29

## 2024-02-29 NOTE — TELEPHONE ENCOUNTER
Prolia prior auth has been initiated.     [facility] Prior Auth Specialist Fe called and left voicemail that she needs to speak with someone in Dr lebron office (voicemail left on U[facility] #).   I called Fe Back and left my CB information.     Spoke with mary Miramontes approved

## 2024-03-04 DIAGNOSIS — M81.0 AGE-RELATED OSTEOPOROSIS WITHOUT CURRENT PATHOLOGICAL FRACTURE: ICD-10-CM

## 2024-03-04 NOTE — TELEPHONE ENCOUNTER
Med refill left on [facility] Rx line    Please print to fax over Prolia script to the specialty pharmacy. [phone]

## 2024-03-19 ENCOUNTER — TELEPHONE (OUTPATIENT)
Dept: FAMILY MEDICINE CLINIC | Facility: CLINIC | Age: 79
End: 2024-03-19

## 2024-03-19 NOTE — TELEPHONE ENCOUNTER
The bisphosphonate class of medications is an alternative option for some patients. I did not think this was a good fit for her as she is lying down much of the day per son and that particular medication can cause esophageal issues if you are not upright for a long time period after taking the med

## 2024-03-19 NOTE — TELEPHONE ENCOUNTER
Pt called and is wondering what is the other option for her osteoporosis besides PROLIA. Pt was upset and she stated that she rather not take anything for it so she does not have to deal with the insurance authorization.

## 2024-03-21 NOTE — TELEPHONE ENCOUNTER
Explained to pt that her PROLIA was approved and she does not need to contact her insurance. We will call her to make an appt when we have the medication.

## 2024-04-09 ENCOUNTER — APPOINTMENT (OUTPATIENT)
Dept: RADIOLOGY | Facility: HOSPITAL | Age: 79
End: 2024-04-09
Payer: COMMERCIAL

## 2024-04-09 ENCOUNTER — APPOINTMENT (EMERGENCY)
Dept: CT IMAGING | Facility: HOSPITAL | Age: 79
End: 2024-04-09
Payer: COMMERCIAL

## 2024-04-09 ENCOUNTER — OFFICE VISIT (OUTPATIENT)
Dept: URGENT CARE | Facility: CLINIC | Age: 79
End: 2024-04-09
Payer: COMMERCIAL

## 2024-04-09 ENCOUNTER — HOSPITAL ENCOUNTER (EMERGENCY)
Facility: HOSPITAL | Age: 79
Discharge: HOME | End: 2024-04-10
Attending: EMERGENCY MEDICINE
Payer: COMMERCIAL

## 2024-04-09 VITALS
HEART RATE: 91 BPM | OXYGEN SATURATION: 97 % | RESPIRATION RATE: 20 BRPM | HEIGHT: 62 IN | BODY MASS INDEX: 31.65 KG/M2 | WEIGHT: 172 LBS | DIASTOLIC BLOOD PRESSURE: 60 MMHG | SYSTOLIC BLOOD PRESSURE: 132 MMHG | TEMPERATURE: 97.6 F

## 2024-04-09 DIAGNOSIS — R06.02 SHORTNESS OF BREATH: ICD-10-CM

## 2024-04-09 DIAGNOSIS — R06.02 SOB (SHORTNESS OF BREATH): Primary | ICD-10-CM

## 2024-04-09 DIAGNOSIS — J84.10 PULMONARY FIBROSIS (HCC): ICD-10-CM

## 2024-04-09 DIAGNOSIS — R05.9 COUGH: Primary | ICD-10-CM

## 2024-04-09 DIAGNOSIS — J06.9 URI (UPPER RESPIRATORY INFECTION): ICD-10-CM

## 2024-04-09 LAB
ALBUMIN SERPL BCP-MCNC: 4.1 G/DL (ref 3.5–5)
ALP SERPL-CCNC: 67 U/L (ref 34–104)
ALT SERPL W P-5'-P-CCNC: 47 U/L (ref 7–52)
ANION GAP SERPL CALCULATED.3IONS-SCNC: 10 MMOL/L (ref 4–13)
AST SERPL W P-5'-P-CCNC: 85 U/L (ref 13–39)
BASOPHILS # BLD AUTO: 0.04 THOUSANDS/ÂΜL (ref 0–0.1)
BASOPHILS NFR BLD AUTO: 0 % (ref 0–1)
BILIRUB SERPL-MCNC: 0.62 MG/DL (ref 0.2–1)
BNP SERPL-MCNC: 81 PG/ML (ref 0–100)
BUN SERPL-MCNC: 21 MG/DL (ref 5–25)
CALCIUM SERPL-MCNC: 10.2 MG/DL (ref 8.4–10.2)
CARDIAC TROPONIN I PNL SERPL HS: 22 NG/L
CHLORIDE SERPL-SCNC: 108 MMOL/L (ref 96–108)
CO2 SERPL-SCNC: 23 MMOL/L (ref 21–32)
CREAT SERPL-MCNC: 1.19 MG/DL (ref 0.6–1.3)
D DIMER PPP FEU-MCNC: 0.86 UG/ML FEU
EOSINOPHIL # BLD AUTO: 0.16 THOUSAND/ÂΜL (ref 0–0.61)
EOSINOPHIL NFR BLD AUTO: 2 % (ref 0–6)
ERYTHROCYTE [DISTWIDTH] IN BLOOD BY AUTOMATED COUNT: 16.1 % (ref 11.6–15.1)
FLUAV RNA RESP QL NAA+PROBE: NEGATIVE
FLUBV RNA RESP QL NAA+PROBE: NEGATIVE
GFR SERPL CREATININE-BSD FRML MDRD: 43 ML/MIN/1.73SQ M
GLUCOSE SERPL-MCNC: 147 MG/DL (ref 65–140)
HCT VFR BLD AUTO: 38.6 % (ref 34.8–46.1)
HGB BLD-MCNC: 11.7 G/DL (ref 11.5–15.4)
IMM GRANULOCYTES # BLD AUTO: 0.04 THOUSAND/UL (ref 0–0.2)
IMM GRANULOCYTES NFR BLD AUTO: 0 % (ref 0–2)
LACTATE SERPL-SCNC: 1.7 MMOL/L (ref 0.5–2)
LYMPHOCYTES # BLD AUTO: 2.37 THOUSANDS/ÂΜL (ref 0.6–4.47)
LYMPHOCYTES NFR BLD AUTO: 27 % (ref 14–44)
MCH RBC QN AUTO: 27 PG (ref 26.8–34.3)
MCHC RBC AUTO-ENTMCNC: 30.3 G/DL (ref 31.4–37.4)
MCV RBC AUTO: 89 FL (ref 82–98)
MONOCYTES # BLD AUTO: 1.13 THOUSAND/ÂΜL (ref 0.17–1.22)
MONOCYTES NFR BLD AUTO: 13 % (ref 4–12)
NEUTROPHILS # BLD AUTO: 5.2 THOUSANDS/ÂΜL (ref 1.85–7.62)
NEUTS SEG NFR BLD AUTO: 58 % (ref 43–75)
NRBC BLD AUTO-RTO: 0 /100 WBCS
PLATELET # BLD AUTO: 205 THOUSANDS/UL (ref 149–390)
PMV BLD AUTO: 10.6 FL (ref 8.9–12.7)
POTASSIUM SERPL-SCNC: 4 MMOL/L (ref 3.5–5.3)
PROCALCITONIN SERPL-MCNC: 0.14 NG/ML
PROT SERPL-MCNC: 8.4 G/DL (ref 6.4–8.4)
RBC # BLD AUTO: 4.33 MILLION/UL (ref 3.81–5.12)
RSV RNA RESP QL NAA+PROBE: NEGATIVE
SARS-COV-2 RNA RESP QL NAA+PROBE: NEGATIVE
SODIUM SERPL-SCNC: 141 MMOL/L (ref 135–147)
WBC # BLD AUTO: 8.94 THOUSAND/UL (ref 4.31–10.16)

## 2024-04-09 PROCEDURE — 80053 COMPREHEN METABOLIC PANEL: CPT | Performed by: EMERGENCY MEDICINE

## 2024-04-09 PROCEDURE — 83605 ASSAY OF LACTIC ACID: CPT

## 2024-04-09 PROCEDURE — 85025 COMPLETE CBC W/AUTO DIFF WBC: CPT | Performed by: EMERGENCY MEDICINE

## 2024-04-09 PROCEDURE — 85379 FIBRIN DEGRADATION QUANT: CPT

## 2024-04-09 PROCEDURE — 99285 EMERGENCY DEPT VISIT HI MDM: CPT

## 2024-04-09 PROCEDURE — 84145 PROCALCITONIN (PCT): CPT

## 2024-04-09 PROCEDURE — 83880 ASSAY OF NATRIURETIC PEPTIDE: CPT

## 2024-04-09 PROCEDURE — 99213 OFFICE O/P EST LOW 20 MIN: CPT | Performed by: FAMILY MEDICINE

## 2024-04-09 PROCEDURE — 71275 CT ANGIOGRAPHY CHEST: CPT

## 2024-04-09 PROCEDURE — 84484 ASSAY OF TROPONIN QUANT: CPT

## 2024-04-09 PROCEDURE — 93005 ELECTROCARDIOGRAM TRACING: CPT

## 2024-04-09 PROCEDURE — 36415 COLL VENOUS BLD VENIPUNCTURE: CPT

## 2024-04-09 PROCEDURE — 87040 BLOOD CULTURE FOR BACTERIA: CPT

## 2024-04-09 PROCEDURE — 0241U HB NFCT DS VIR RESP RNA 4 TRGT: CPT | Performed by: EMERGENCY MEDICINE

## 2024-04-09 PROCEDURE — 71046 X-RAY EXAM CHEST 2 VIEWS: CPT

## 2024-04-09 RX ORDER — ALBUTEROL SULFATE 90 UG/1
2 AEROSOL, METERED RESPIRATORY (INHALATION) ONCE
Status: COMPLETED | OUTPATIENT
Start: 2024-04-10 | End: 2024-04-10

## 2024-04-09 RX ORDER — CEFTRIAXONE 2 G/50ML
2000 INJECTION, SOLUTION INTRAVENOUS ONCE
Status: COMPLETED | OUTPATIENT
Start: 2024-04-09 | End: 2024-04-09

## 2024-04-09 RX ADMIN — AZITHROMYCIN 500 MG: 500 INJECTION, POWDER, LYOPHILIZED, FOR SOLUTION INTRAVENOUS at 22:13

## 2024-04-09 RX ADMIN — CEFTRIAXONE 2000 MG: 2 INJECTION, SOLUTION INTRAVENOUS at 21:44

## 2024-04-09 RX ADMIN — IOHEXOL 100 ML: 350 INJECTION, SOLUTION INTRAVENOUS at 22:48

## 2024-04-09 NOTE — PROGRESS NOTES
[facility]        NAME: Kenzie Cali is a 78 y.o. female  : 1945    MRN: [id number]  DATE: 2024  TIME: 4:32 PM    Assessment and Plan   SOB (shortness of breath) [R06.02]  1. SOB (shortness of breath)              Patient Instructions       Follow up with PCP in 3-5 days.  Proceed to  ER if symptoms worsen.    If tests have been performed at [facility], our office will contact you with results if changes need to be made to the care plan discussed with you at the visit.  You can review your full results on [facility] MyChart.    Chief Complaint     Chief Complaint   Patient presents with    Cough    chest congestion     Patient reports that she has had symptoms for the past 2 days. States that she had had the same symptoms when she had pneumonia. 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           History of Present Illness       78-year-old female with history of PE presenting with 2-day history of increased shortness of breath, chest tightness and cough.  She also reports having 8/10 pain in the middle of her back which she which she states has been feeling better after no longer laying flat.  Denies any chest pain.  Denies any lightheadedness or dizziness.    Cough  Associated symptoms include myalgias and shortness of breath. Pertinent negatives include no chills or fever.       Review of Systems   Review of Systems   Constitutional:  Positive for fatigue. Negative for chills and fever.   HENT: Negative.     Eyes: Negative.    Respiratory:  Positive for cough and shortness of breath.    Cardiovascular: Negative.    Gastrointestinal: Negative.    Genitourinary: Negative.    Musculoskeletal:  Positive for myalgias.   Skin: Negative.    Allergic/Immunologic: Negative.    Neurological: Negative.    Hematological: Negative.    Psychiatric/Behavioral: Negative.           Current Medications       Current Outpatient Medications:     amLODIPine (NORVASC) 10 mg tablet, Take  "10 mg by mouth daily, Disp: , Rfl:     apixaban (Eliquis) 5 mg, Take 5 mg by mouth 2 (two) times a day, Disp: , Rfl:     atorvastatin (LIPITOR) 20 mg tablet, Take 20 mg by mouth daily, Disp: , Rfl:     denosumab (PROLIA) 60 mg/mL, Inject 1 mL (60 mg total) under the skin once for 1 dose, Disp: 1 mL, Rfl: 0    escitalopram (LEXAPRO) 20 mg tablet, Take 20 mg by mouth daily at bedtime, Disp: , Rfl:     levothyroxine (Synthroid) 150 mcg tablet, Take 1 tablet (150 mcg total) by mouth daily in the early morning, Disp: 30 tablet, Rfl: 5    omeprazole (PriLOSEC) 20 mg delayed release capsule, Take 20 mg by mouth as needed, Disp: , Rfl:     tamsulosin (FLOMAX) 0.4 mg, Take 0.4 mg by mouth daily with dinner, Disp: , Rfl:     Current Allergies     Allergies as of 04/09/2024    (No Known Allergies)            The following portions of the patient's history were reviewed and updated as appropriate: allergies, current medications, past family history, past medical history, past social history, past surgical history and problem list.     Past Medical History:   Diagnosis Date    Cataract 03/2023    Deep vein thrombosis (HCC)     Diabetes mellitus (HCC)     Diverticulitis     Hypertension     Hypothyroid     Osteoarthritis     Pneumonia     Pulmonary fibrosis (HCC)        Past Surgical History:   Procedure Laterality Date    CATARACT EXTRACTION      CHOLECYSTECTOMY      SKIN CANCER EXCISION         Family History   Problem Relation Age of Onset    Breast cancer Mother     Heart disease Father          Medications have been verified.        Objective   /60   Pulse 91   Temp 97.6 °F (36.4 °C) (Tympanic)   Resp 20   Ht 5' 2\" (1.575 m)   Wt 78 kg (172 lb)   SpO2 97%   BMI 31.46 kg/m²   No LMP recorded.       Physical Exam     Physical Exam  Vitals and nursing note reviewed.   Constitutional:       Appearance: She is well-developed. She is ill-appearing and diaphoretic.   HENT:      Head: Normocephalic.      Nose: Nose " normal.      Mouth/Throat:      Pharynx: No oropharyngeal exudate or posterior oropharyngeal erythema.   Eyes:      Pupils: Pupils are equal, round, and reactive to light.   Cardiovascular:      Rate and Rhythm: Normal rate and regular rhythm.   Pulmonary:      Effort: Pulmonary effort is normal.      Breath sounds: No wheezing or rales.   Abdominal:      General: Abdomen is flat.   Musculoskeletal:         General: Normal range of motion.      Cervical back: Normal range of motion.   Skin:     General: Skin is warm.   Neurological:      Mental Status: She is alert and oriented to person, place, and time.

## 2024-04-09 NOTE — ED NOTES
"Pt brought back for triage. Pt began stating that the wait time is ridiculous and that when you come to an ER you're supposed to be brought back immediately. ED triage process and emergencies were educated to the pt to which pt rolled her eyes. Pt informed she would get blood work; ED tech attempted blood work however pt kept moving arm and yelling. This RN attempted blood work after unsuccessful attempt by tech and pt stated, \"she didn't get it?! Oh my god, she needs to go back to phlebotomy school.\" This nurse stated that that comment was not necessary and we are just trying to help to which pt rolled her eyes again.     Katerine Marr, RN  04/09/24 [id number]    "

## 2024-04-10 ENCOUNTER — TELEPHONE (OUTPATIENT)
Age: 79
End: 2024-04-10

## 2024-04-10 VITALS
SYSTOLIC BLOOD PRESSURE: 153 MMHG | RESPIRATION RATE: 20 BRPM | HEIGHT: 62 IN | DIASTOLIC BLOOD PRESSURE: 70 MMHG | WEIGHT: 172 LBS | HEART RATE: 85 BPM | BODY MASS INDEX: 31.65 KG/M2 | TEMPERATURE: 97.8 F | OXYGEN SATURATION: 94 %

## 2024-04-10 LAB
2HR DELTA HS TROPONIN: 0 NG/L
CARDIAC TROPONIN I PNL SERPL HS: 22 NG/L

## 2024-04-10 PROCEDURE — 36415 COLL VENOUS BLD VENIPUNCTURE: CPT

## 2024-04-10 PROCEDURE — 84484 ASSAY OF TROPONIN QUANT: CPT

## 2024-04-10 RX ORDER — AZITHROMYCIN 250 MG/1
250 TABLET, FILM COATED ORAL EVERY 24 HOURS
Qty: 4 TABLET | Refills: 0 | Status: SHIPPED | OUTPATIENT
Start: 2024-04-10 | End: 2024-04-14

## 2024-04-10 RX ORDER — PREDNISONE 20 MG/1
50 TABLET ORAL DAILY
Qty: 10 TABLET | Refills: 0 | Status: SHIPPED | OUTPATIENT
Start: 2024-04-10 | End: 2024-04-10

## 2024-04-10 RX ORDER — PREDNISONE 20 MG/1
40 TABLET ORAL DAILY
Qty: 8 TABLET | Refills: 0 | Status: SHIPPED | OUTPATIENT
Start: 2024-04-10 | End: 2024-04-14

## 2024-04-10 RX ADMIN — PREDNISONE 50 MG: 20 TABLET ORAL at 00:34

## 2024-04-10 RX ADMIN — ALBUTEROL SULFATE 2 PUFF: 90 AEROSOL, METERED RESPIRATORY (INHALATION) at 00:34

## 2024-04-10 NOTE — ED PROVIDER NOTES
"History  Chief Complaint   Patient presents with    Cough     Pt to ED from  c/o cough x 3 days. Sent to r/o pna.     Pt is a 78-year-old female past medical history of PE, pulmonary fibrosis arriving for evaluation of cough that started a few days ago that has progressed to shortness of breath.  Patient is concerned she has pneumonia.  Patient reports that she went to urgent care.  Patient states she has exertional shortness of breath.  Patient reports that she does have oxygen at home.  Patient states she has not seen a pulmonologist in \"years,\" as the provider she followed left the practice and she did not re-establish.  Patient states that she has had absolute compliance to her Eliquis.  Patient denies any recent leg swelling.  Patient reports that 2 nights ago she noted she was having right lower rib pain, denies any abdominal pain, denies, denies any nausea, vomiting.  Patient states that she is concerned she has pneumonia went to urgent care and they referred her to the emergency room.  She has no increased work of breathing, no tachypnea, patient denies any substernal chest pain.        Prior to Admission Medications   Prescriptions Last Dose Informant Patient Reported? Taking?   amLODIPine (NORVASC) 10 mg tablet   Yes No   Sig: Take 10 mg by mouth daily   apixaban (Eliquis) 5 mg   Yes No   Sig: Take 5 mg by mouth 2 (two) times a day   atorvastatin (LIPITOR) 20 mg tablet   Yes No   Sig: Take 20 mg by mouth daily   denosumab (PROLIA) 60 mg/mL   No No   Sig: Inject 1 mL (60 mg total) under the skin once for 1 dose   escitalopram (LEXAPRO) 20 mg tablet   Yes No   Sig: Take 20 mg by mouth daily at bedtime   levothyroxine (Synthroid) 150 mcg tablet   No No   Sig: Take 1 tablet (150 mcg total) by mouth daily in the early morning   omeprazole (PriLOSEC) 20 mg delayed release capsule   Yes No   Sig: Take 20 mg by mouth as needed   tamsulosin (FLOMAX) 0.4 mg   Yes No   Sig: Take 0.4 mg by mouth daily with dinner    "   Facility-Administered Medications: None       Past Medical History:   Diagnosis Date    Cataract 03/2023    Deep vein thrombosis (HCC)     Diabetes mellitus (HCC)     Diverticulitis     Hypertension     Hypothyroid     Osteoarthritis     Pneumonia     Pulmonary fibrosis (HCC)        Past Surgical History:   Procedure Laterality Date    CATARACT EXTRACTION      CHOLECYSTECTOMY      SKIN CANCER EXCISION         Family History   Problem Relation Age of Onset    Breast cancer Mother     Heart disease Father      I have reviewed and agree with the history as documented.    E-Cigarette/Vaping    E-Cigarette Use Never User      E-Cigarette/Vaping Substances    Nicotine No     THC No     CBD No     Flavoring No     Other No     Unknown No      Social History     Tobacco Use    Smoking status: Never    Smokeless tobacco: Never   Vaping Use    Vaping status: Never Used   Substance Use Topics    Alcohol use: Not Currently    Drug use: Never       Review of Systems   Constitutional: Negative.    HENT: Negative.     Eyes: Negative.    Respiratory:  Positive for cough and shortness of breath.    Cardiovascular: Negative.    Gastrointestinal: Negative.    Endocrine: Negative.    Genitourinary: Negative.    Musculoskeletal: Negative.    Skin: Negative.    Allergic/Immunologic: Negative.    Neurological: Negative.    Hematological: Negative.    Psychiatric/Behavioral: Negative.     All other systems reviewed and are negative.      Physical Exam  Physical Exam  Vitals and nursing note reviewed.   Constitutional:       Appearance: Normal appearance. She is normal weight.   HENT:      Head: Normocephalic.      Right Ear: External ear normal.      Left Ear: External ear normal.      Nose: Congestion and rhinorrhea present.      Mouth/Throat:      Mouth: Mucous membranes are moist.   Eyes:      General:         Right eye: No discharge.         Left eye: No discharge.      Extraocular Movements: Extraocular movements intact.       Conjunctiva/sclera: Conjunctivae normal.      Pupils: Pupils are equal, round, and reactive to light.   Cardiovascular:      Rate and Rhythm: Normal rate and regular rhythm.      Pulses: Normal pulses.      Heart sounds: Normal heart sounds.   Pulmonary:      Effort: Pulmonary effort is normal.      Breath sounds: Normal breath sounds.      Comments: Coarse   Abdominal:      General: Abdomen is flat. Bowel sounds are normal.      Palpations: Abdomen is soft.   Musculoskeletal:         General: Normal range of motion.      Cervical back: Normal range of motion and neck supple.   Skin:     General: Skin is warm.      Capillary Refill: Capillary refill takes less than 2 seconds.   Neurological:      General: No focal deficit present.      Mental Status: She is alert and oriented to person, place, and time. Mental status is at baseline.   Psychiatric:         Mood and Affect: Mood normal.         Behavior: Behavior normal.         Thought Content: Thought content normal.         Judgment: Judgment normal.         Vital Signs  ED Triage Vitals   Temperature Pulse Respirations Blood Pressure SpO2   04/09/24 1703 04/09/24 1703 04/09/24 1703 04/09/24 1703 04/09/24 1703   97.8 °F (36.6 °C) 87 20 135/66 92 %      Temp src Heart Rate Source Patient Position - Orthostatic VS BP Location FiO2 (%)   -- 04/09/24 1703 04/09/24 2145 04/09/24 1703 --    Monitor Lying Right arm       Pain Score       04/09/24 1703       6           Vitals:    04/09/24 2200 04/09/24 2330 04/10/24 0030 04/10/24 0100   BP: (!) 173/99 (!) 180/81 (!) 175/77 153/70   Pulse: 93 90 81 85   Patient Position - Orthostatic VS: Lying            Visual Acuity      ED Medications  Medications   cefTRIAXone (ROCEPHIN) IVPB (premix in dextrose) 2,000 mg 50 mL (0 mg Intravenous Stopped 4/9/24 2206)   azithromycin (ZITHROMAX) 500 mg in sodium chloride 0.9% 250mL IVPB 500 mg (0 mg Intravenous Stopped 4/10/24 0033)   iohexol (OMNIPAQUE) 350 MG/ML injection (SINGLE-DOSE)  100 mL (100 mL Intravenous Given 4/9/24 2248)   predniSONE tablet 50 mg (50 mg Oral Given 4/10/24 0034)   albuterol (PROVENTIL HFA,VENTOLIN HFA) inhaler 2 puff (2 puffs Inhalation Given 4/10/24 0034)       Diagnostic Studies  Results Reviewed       Procedure Component Value Units Date/Time    Blood culture #1 [[id number]] Collected: 04/09/24 2140    Lab Status: Preliminary result Specimen: Blood from Arm, Right Updated: 04/10/24 0801     Blood Culture Received in Microbiology Lab. Culture in Progress.    Blood culture #2 [[id number]] Collected: 04/09/24 2140    Lab Status: Preliminary result Specimen: Blood from Arm, Right Updated: 04/10/24 0801     Blood Culture Received in Microbiology Lab. Culture in Progress.    HS Troponin I 2hr [[id number]]  (Normal) Collected: 04/10/24 0050    Lab Status: Final result Specimen: Blood from Arm, Right Updated: 04/10/24 0119     hs TnI 2hr 22 ng/L      Delta 2hr hsTnI 0 ng/L     Procalcitonin [[id number]]  (Normal) Collected: 04/09/24 2140    Lab Status: Final result Specimen: Blood from Arm, Right Updated: 04/09/24 2225     Procalcitonin 0.14 ng/ml     HS Troponin 0hr (reflex protocol) [[id number]]  (Normal) Collected: 04/09/24 2140    Lab Status: Final result Specimen: Blood from Arm, Right Updated: 04/09/24 2222     hs TnI 0hr 22 ng/L     Lactic acid, plasma (w/reflex if result > 2.0) [[id number]]  (Normal) Collected: 04/09/24 2143    Lab Status: Final result Specimen: Blood from Arm, Right Updated: 04/09/24 2214     LACTIC ACID 1.7 mmol/L     Narrative:      Result may be elevated if tourniquet was used during collection.    D-dimer, quantitative [[id number]]  (Abnormal) Collected: 04/09/24 2140    Lab Status: Final result Specimen: Blood from Arm, Right Updated: 04/09/24 2210     D-Dimer, Quant 0.86 ug/ml FEU     Narrative:      In the evaluation for possible pulmonary embolism, in the appropriate (Well's Score of 4 or less) patient, the age adjusted d-dimer cutoff for this  patient can be calculated as:    Age x 0.01 (in ug/mL) for Age-adjusted D-dimer exclusion threshold for a patient over 50 years.    B-Type Natriuretic Peptide(BNP) [[id number]]  (Normal) Collected: 04/09/24 1712    Lab Status: Final result Specimen: Blood from Arm, Left Updated: 04/09/24 2128     BNP 81 pg/mL     FLU/RSV/COVID - if FLU/RSV clinically relevant [[id number]]  (Normal) Collected: 04/09/24 1712    Lab Status: Final result Specimen: Nares from Nose Updated: 04/09/24 1759     SARS-CoV-2 Negative     INFLUENZA A PCR Negative     INFLUENZA B PCR Negative     RSV PCR Negative    Narrative:      FOR PEDIATRIC PATIENTS - copy/paste COVID Guidelines URL to browser: [email or web address]    SARS-CoV-2 assay is a Nucleic Acid Amplification assay intended for the  qualitative detection of nucleic acid from SARS-CoV-2 in nasopharyngeal  swabs. Results are for the presumptive identification of SARS-CoV-2 RNA.    Positive results are indicative of infection with SARS-CoV-2, the virus  causing COVID-19, but do not rule out bacterial infection or co-infection  with other viruses. Laboratories within the [place] and its  territories are required to report all positive results to the appropriate  public health authorities. Negative results do not preclude SARS-CoV-2  infection and should not be used as the sole basis for treatment or other  patient management decisions. Negative results must be combined with  clinical observations, patient history, and epidemiological information.  This test has not been FDA cleared or approved.    This test has been authorized by FDA under an Emergency Use Authorization  (EUA). This test is only authorized for the duration of time the  declaration that circumstances exist justifying the authorization of the  emergency use of an in vitro diagnostic tests for detection of SARS-CoV-2  virus and/or diagnosis of COVID-19 infection under  section 564(b)(1) of  the Act, 21 U.S.C. 360bbb-3(b)(1), unless the authorization is terminated  or revoked sooner. The test has been validated but independent review by FDA  and CLIA is pending.    Test performed using [employer] GeneXpert: This RT-PCR assay targets N2,  a region unique to SARS-CoV-2. A conserved region in the E-gene was chosen  for pan-Sarbecovirus detection which includes SARS-CoV-2.    According to CMS-2020-01-R, this platform meets the definition of high-throughput technology.    Comprehensive metabolic panel [[id number]]  (Abnormal) Collected: 04/09/24 1712    Lab Status: Final result Specimen: Blood from Arm, Left Updated: 04/09/24 1743     Sodium 141 mmol/L      Potassium 4.0 mmol/L      Chloride 108 mmol/L      CO2 23 mmol/L      ANION GAP 10 mmol/L      BUN 21 mg/dL      Creatinine 1.19 mg/dL      Glucose 147 mg/dL      Calcium 10.2 mg/dL      AST 85 U/L      ALT 47 U/L      Alkaline Phosphatase 67 U/L      Total Protein 8.4 g/dL      Albumin 4.1 g/dL      Total Bilirubin 0.62 mg/dL      eGFR 43 ml/min/1.73sq m     Narrative:      National Kidney Disease Foundation guidelines for Chronic Kidney Disease (CKD):     Stage 1 with normal or high GFR (GFR > 90 mL/min/1.73 square meters)    Stage 2 Mild CKD (GFR = 60-89 mL/min/1.73 square meters)    Stage 3A Moderate CKD (GFR = 45-59 mL/min/1.73 square meters)    Stage 3B Moderate CKD (GFR = 30-44 mL/min/1.73 square meters)    Stage 4 Severe CKD (GFR = 15-29 mL/min/1.73 square meters)    Stage 5 End Stage CKD (GFR <15 mL/min/1.73 square meters)  Note: GFR calculation is accurate only with a steady state creatinine    CBC and differential [[id number]]  (Abnormal) Collected: 04/09/24 1712    Lab Status: Final result Specimen: Blood from Arm, Left Updated: 04/09/24 1724     WBC 8.94 Thousand/uL      RBC 4.33 Million/uL      Hemoglobin 11.7 g/dL      Hematocrit 38.6 %      MCV 89 fL      MCH 27.0 pg      MCHC 30.3 g/dL      RDW 16.1 %      MPV 10.6 fL       Platelets 205 Thousands/uL      nRBC 0 /100 WBCs      Segmented % 58 %      Immature Grans % 0 %      Lymphocytes % 27 %      Monocytes % 13 %      Eosinophils Relative 2 %      Basophils Relative 0 %      Absolute Neutrophils 5.20 Thousands/µL      Absolute Immature Grans 0.04 Thousand/uL      Absolute Lymphocytes 2.37 Thousands/µL      Absolute Monocytes 1.13 Thousand/µL      Eosinophils Absolute 0.16 Thousand/µL      Basophils Absolute 0.04 Thousands/µL                    CTA ED chest PE Study   Final Result by Jorge Forman MD (04/09 2342)      1.  Motion limited examination. No evidence of pulmonary embolism to the proximal segmental level.   2.  Redemonstrated interstitial/fibrotic changes of the lungs. No focal consolidation.   3.  Cardiomegaly. Coronary artery calcifications.      Workstation performed: [id number]         XR chest 2 views   Final Result by Jeanne Bolden MD (04/09 2126)      No acute cardiopulmonary disease.      Bilateral reticular pattern compatible with known pulmonary fibrosis.            Workstation performed: [id number]                    Procedures  ECG 12 Lead Documentation Only    Date/Time: 4/10/2024 10:03 PM    Performed by: ALTHEA Mendoza  Authorized by: ALTHEA Mendoza    Indications / Diagnosis:  Shortness of breath  ECG reviewed by me, the ED Provider: yes    Patient location:  ED  Interpretation:     Interpretation: normal    Rate:     ECG rate:  78    ECG rate assessment: normal    Rhythm:     Rhythm: sinus rhythm    Ectopy:     Ectopy: none    QRS:     QRS axis:  Normal  Conduction:     Conduction: normal    ST segments:     ST segments:  Normal  T waves:     T waves: normal    Other findings:     Other findings: prolonged qTc interval             ED Course  ED Course as of 04/10/24 1154   Tue Apr 09, 2024   2201 BNP: 81   2217 D-Dimer, Quant(!): 0.86   Wed Apr 10, 2024   0030 Patient signed out to Dr. Owen pending ambulatory pulse ox for pending dispo.              HEART Risk Score      Flowsheet Row Most Recent Value   Heart Score Risk Calculator    History 0 Filed at: 04/10/2024 0000   ECG 1 Filed at: 04/10/2024 0000   Age 2 Filed at: 04/10/2024 0000   Risk Factors 1 Filed at: 04/10/2024 0000   Troponin 1 Filed at: 04/10/2024 0000   HEART Score 5 Filed at: 04/10/2024 0000                          SBIRT 20yo+      Flowsheet Row Most Recent Value   Initial Alcohol Screen: US AUDIT-C     1. How often do you have a drink containing alcohol? 0 Filed at: 04/09/2024 2052   2. How many drinks containing alcohol do you have on a typical day you are drinking?  0 Filed at: 04/09/2024 2052   3a. Male UNDER 65: How often do you have five or more drinks on one occasion? 0 Filed at: 04/09/2024 2052   3b. FEMALE Any Age, or MALE 65+: How often do you have 4 or more drinks on one occassion? 0 Filed at: 04/09/2024 2052   Audit-C Score 0 Filed at: 04/09/2024 2052   LORI: How many times in the past year have you...    Used an illegal drug or used a prescription medication for non-medical reasons? Never Filed at: 04/09/2024 2052            Wells' Criteria for PE      Flowsheet Row Most Recent Value   Wells' Criteria for PE    Clinical signs and symptoms of DVT 0 Filed at: 04/09/2024 2119   PE is primary diagnosis or equally likely 3 Filed at: 04/09/2024 2119   HR >100 0 Filed at: 04/09/2024 2119   Immobilization at least 3 days or Surgery in the previous 4 weeks 0 Filed at: 04/09/2024 2119   Previous, objectively diagnosed PE or DVT 1.5 Filed at: 04/09/2024 2119   Hemoptysis 0 Filed at: 04/09/2024 2119   Malignancy with treatment within 6 months or palliative 0 Filed at: 04/09/2024 2119   Wells' Criteria Total 4.5 Filed at: 04/09/2024 2119                  Medical Decision Making  DDx: pneumonia, PE   Patient presenting for evaluation, concerned specifically with pneumonia. Patient describes exertional shortness of breath and pleuritic pain. No substernal chest pain. Pain is  constant.   Xray concerning for RLL pneumonia. Sepsis labs added and abx provided.   Blood work: No leukocytosis, no anemia.  No HALEIGH. No electrolyte abnormality. LFT WNL.  No elevated lactic, or procal. EKG and troponin ordered with pending PE scan. Heart score of 5. BNP WNL.   Given age, and prior history of PE, and calculated Well's score DDimer ordered and cannot be age adjusted. PE scan ordered and reinforced prior history of pulmonary fibrosis. Pneumonia not visualized by CT scan. Discussed CT findings with patient. Patient has been resting comfortably with no episodes of hypoxia. Patient provided dose of steroids, and albuterol inhaler. Patient reports has O2 at home if needed. Patient signed out to Dr. Owen while awaiting ambulatory pulse ox eval. With likely disposition to be discharge referral to pulmonology to re-establish placed. Will treat with steroid burst and abx provided given pulmonary fibrosis history. Heart score of 5, cardiology referral placed per protocol.       Amount and/or Complexity of Data Reviewed  Labs: ordered. Decision-making details documented in ED Course.  Radiology: ordered.    Risk  Prescription drug management.             Disposition  Final diagnoses:   Cough   Shortness of breath   Pulmonary fibrosis (HCC)   URI (upper respiratory infection)     Time reflects when diagnosis was documented in both MDM as applicable and the Disposition within this note       Time User Action Codes Description Comment    4/10/2024 12:28 AM Maria Alejandra Saucedo Add [R05.9] Cough     4/10/2024 12:28 AM Maria Alejandra Saucedo Add [R06.02] Shortness of breath     4/10/2024 12:28 AM Maria Alejandra Saucedo Add [J84.10] Pulmonary fibrosis (HCC)     4/10/2024 12:29 AM Maria Alejandra Saucedo Add [J06.9] URI (upper respiratory infection)           ED Disposition       ED Disposition   Discharge    Condition   Stable    Date/Time   Wed Apr 10, 2024 0101    Comment   Kenzie Cali discharge to home/self care.                    Follow-up Information       Follow up With Specialties Details Why Contact Info Additional Information     [facility] Emergency Department Emergency Medicine Go to  If symptoms worsen [address] [facility] [address] [address] [address] [phone] [facility] Emergency Department, [address] [place], [place] [address]    [facility] Pulmonary [facility] Pulmonology Schedule an appointment as soon as possible for a visit in 1 day For further evaluation of symptoms [address]  [address]  [facility] [address] [phone] [facility] [place], [address] [place] [address]. [address], [place] [address] [phone]            Discharge Medication List as of 4/10/2024  1:01 AM        START taking these medications    Details   azithromycin (ZITHROMAX) 250 mg tablet Take 1 tablet (250 mg total) by mouth every 24 hours for 4 days, Starting Wed 4/10/2024, Until Sun 4/14/2024, Normal      predniSONE 20 mg tablet Take 2 tablets (40 mg total) by mouth daily for 4 days, Starting Wed 4/10/2024, Until Sun 4/14/2024, Normal           CONTINUE these medications which have NOT CHANGED    Details   amLODIPine (NORVASC) 10 mg tablet Take 10 mg by mouth daily, Historical Med      apixaban (Eliquis) 5 mg Take 5 mg by mouth 2 (two) times a day, Historical Med      atorvastatin (LIPITOR) 20 mg tablet Take 20 mg by mouth daily, Starting Wed 11/9/2022, Historical Med      denosumab (PROLIA) 60 mg/mL Inject 1 mL (60 mg total) under the skin once for 1 dose, Starting Mon 3/4/2024, Normal      escitalopram (LEXAPRO) 20 mg tablet Take 20 mg by mouth daily at bedtime, Starting Wed 6/21/2023, Historical Med      levothyroxine (Synthroid) 150 mcg tablet Take 1 tablet (150 mcg total) by mouth daily in the early morning, Starting Thu 1/18/2024, Normal      omeprazole (PriLOSEC) 20 mg delayed release capsule Take 20 mg by mouth as  27-Jul-2022 needed, Historical Med      tamsulosin (FLOMAX) 0.4 mg Take 0.4 mg by mouth daily with dinner, Historical Med                 PDMP Review       None            ED Provider  Electronically Signed by             ALTHEA Mendoza  04/10/24 4401

## 2024-04-10 NOTE — ED NOTES
SPO2 on RA while ambulating 86% and . Patient tachypneic and states she is SOB with ambulation.      Paloma Devries RN  04/10/24 0054

## 2024-04-10 NOTE — TELEPHONE ENCOUNTER
Scheduled patient per pulm referral, she was seen by Dr. Simpson, referral ASAP set her for 4/19 in [facility]. Lyft needs to be set up for her, referral attached.

## 2024-04-10 NOTE — DISCHARGE INSTRUCTIONS
Please follow-up with pulmonology.  You are being treated for an upper respiratory infection.    Return for worsening of symptoms.

## 2024-04-10 NOTE — ED CARE HANDOFF
Emergency Department Sign Out Note        Sign out and transfer of care from Maria Alejandra Saucedo. See Separate Emergency Department note.     The patient, Kenzie Cali, was evaluated by the previous provider for cough.                      Wells' Criteria for PE      Flowsheet Row Most Recent Value   Wells' Criteria for PE    Clinical signs and symptoms of DVT 0 Filed at: 04/09/2024 2119   PE is primary diagnosis or equally likely 3 Filed at: 04/09/2024 2119   HR >100 0 Filed at: 04/09/2024 2119   Immobilization at least 3 days or Surgery in the previous 4 weeks 0 Filed at: 04/09/2024 2119   Previous, objectively diagnosed PE or DVT 1.5 Filed at: 04/09/2024 2119   Hemoptysis 0 Filed at: 04/09/2024 2119   Malignancy with treatment within 6 months or palliative 0 Filed at: 04/09/2024 2119   Murphy' Criteria Total 4.5 Filed at: 04/09/2024 2119                  ED Course as of 04/10/24 0101   Wed Apr 10, 2024   0059 On reevaluation patient resting comfortably, her ambulatory pulse ox dropped to 86% on room air however per her pulmonologist she supposed to use oxygen whenever she is walking which she does admit to rarely using, did discuss with the patient the importance of using oxygen with ambulation, and following up with her pulmonologist, no indication for further inpatient evaluation or treatment stable for discharge at this time     Procedures  Medical Decision Making  Amount and/or Complexity of Data Reviewed  Labs: ordered.  Radiology: ordered.    Risk  Prescription drug management.            Disposition  Final diagnoses:   Cough   Shortness of breath   Pulmonary fibrosis (HCC)   URI (upper respiratory infection)     Time reflects when diagnosis was documented in both MDM as applicable and the Disposition within this note       Time User Action Codes Description Comment    4/10/2024 12:28 AM Maria Alejandra Saucedo [R05.9] Cough     4/10/2024 12:28 AM Maria Alejandra Saucedo [R06.02] Shortness of breath     4/10/2024  12:28 AM Maria Alejandra Saucedo [J84.10] Pulmonary fibrosis (HCC)     4/10/2024 12:29 AM Maria Alejandra Saucedo [J06.9] URI (upper respiratory infection)           ED Disposition       ED Disposition   Discharge    Condition   Stable    Date/Time   Wed Apr 10, 2024 0101    Comment   Kenzie VELASCO Viraj discharge to home/self care.                   Follow-up Information       Follow up With Specialties Details Why Contact Info Additional Information     [facility] Emergency Department Emergency Medicine Go to  If symptoms worsen [address] [place] [id number] [phone] [facility] Emergency Department, [address], [place] [address]    [facility] Pulmonary [facility] Pulmonology Schedule an appointment as soon as possible for a visit in 1 day For further evaluation of symptoms [address] [place] [address] Lukepaulina Tejada  [address] [facility] [address]  [phone] [facility] [address] Lukepaulina Tejada, [address], [place] [address] [phone]          Patient's Medications   Discharge Prescriptions    AZITHROMYCIN (ZITHROMAX) 250 MG TABLET    Take 1 tablet (250 mg total) by mouth every 24 hours for 4 days       Start Date: 4/10/2024 End Date: 4/14/2024       Order Dose: 250 mg       Quantity: 4 tablet    Refills: 0    PREDNISONE 20 MG TABLET    Take 2 tablets (40 mg total) by mouth daily for 4 days       Start Date: 4/10/2024 End Date: 4/14/2024       Order Dose: 40 mg       Quantity: 8 tablet    Refills: 0            ED Provider  Electronically Signed by     Dasha Owen DO  04/10/24 0101

## 2024-04-11 ENCOUNTER — VBI (OUTPATIENT)
Dept: FAMILY MEDICINE CLINIC | Facility: CLINIC | Age: 79
End: 2024-04-11

## 2024-04-11 NOTE — TELEPHONE ENCOUNTER
04/11/24 11:53 AM    Patient contacted post ED visit, [facility] department spoke with patient/caregiver and outreach was successful.    Thank you.  Samantha Berry MA  [facility]

## 2024-04-12 LAB
ATRIAL RATE: 78 BPM
P AXIS: 60 DEGREES
PR INTERVAL: 170 MS
QRS AXIS: 21 DEGREES
QRSD INTERVAL: 110 MS
QT INTERVAL: 424 MS
QTC INTERVAL: 483 MS
T WAVE AXIS: 26 DEGREES
VENTRICULAR RATE: 78 BPM

## 2024-04-13 LAB
IRON SERPL-MCNC: 52 UG/DL (ref 27–139)
TSH SERPL DL<=0.005 MIU/L-ACNC: 1.08 UIU/ML (ref 0.45–4.5)

## 2024-04-14 LAB
BACTERIA BLD CULT: NORMAL
BACTERIA BLD CULT: NORMAL

## 2024-04-15 ENCOUNTER — TELEPHONE (OUTPATIENT)
Dept: FAMILY MEDICINE CLINIC | Facility: CLINIC | Age: 79
End: 2024-04-15

## 2024-04-15 LAB
BACTERIA BLD CULT: NORMAL
BACTERIA BLD CULT: NORMAL

## 2024-04-15 NOTE — TELEPHONE ENCOUNTER
Pt aware. Pt is getting RPOLIA injection the same day she has her f/u with Dr. Flores.       ----- Message from ALTHEA Reynolds sent at 4/14/2024  5:54 PM EDT -----  Please notify patient:    Labs look good. Iron and TSH were within normal limits. Follow up with Dr. Flores as scheduled on 5/1.     ----- Message -----  From: [facility] Results In  Sent: 4/13/2024   6:06 AM EDT  To: Lashonda Flores DO

## 2024-04-18 LAB
25(OH)D3+25(OH)D2 SERPL-MCNC: 41.1 NG/ML (ref 30–100)
ALBUMIN SERPL ELPH-MCNC: 3.2 G/DL (ref 2.9–4.4)
ALBUMIN SERPL-MCNC: 4.2 G/DL (ref 3.8–4.8)
ALBUMIN/GLOB SERPL: 0.7 {RATIO} (ref 0.7–1.7)
ALBUMIN/GLOB SERPL: 1.2 {RATIO} (ref 1.2–2.2)
ALP SERPL-CCNC: 71 IU/L (ref 44–121)
ALPHA1 GLOB SERPL ELPH-MCNC: 0.3 G/DL (ref 0–0.4)
ALPHA2 GLOB SERPL ELPH-MCNC: 0.9 G/DL (ref 0.4–1)
ALT SERPL-CCNC: 41 IU/L (ref 0–32)
AST SERPL-CCNC: 67 IU/L (ref 0–40)
B-GLOBULIN SERPL ELPH-MCNC: 1.8 G/DL (ref 0.7–1.3)
BILIRUB SERPL-MCNC: 0.5 MG/DL (ref 0–1.2)
BUN SERPL-MCNC: 24 MG/DL (ref 8–27)
BUN/CREAT SERPL: 24 (ref 12–28)
CALCIUM SERPL-MCNC: 10.2 MG/DL (ref 8.7–10.3)
CHLORIDE SERPL-SCNC: 103 MMOL/L (ref 96–106)
CO2 SERPL-SCNC: 17 MMOL/L (ref 20–29)
COLLAGEN CTX SERPL-MCNC: 294 PG/ML
CREAT SERPL-MCNC: 1.01 MG/DL (ref 0.57–1)
EGFR: 57 ML/MIN/1.73
ENDOMYSIUM IGA SER QL: NEGATIVE
GAMMA GLOB SERPL ELPH-MCNC: 1.4 G/DL (ref 0.4–1.8)
GLIADIN PEPTIDE IGA SER-ACNC: 8 UNITS (ref 0–19)
GLIADIN PEPTIDE IGG SER-ACNC: 2 UNITS (ref 0–19)
GLOBULIN SER CALC-MCNC: 4.5 G/DL (ref 2.2–3.9)
GLOBULIN SER-MCNC: 3.5 G/DL (ref 1.5–4.5)
GLUCOSE SERPL-MCNC: 145 MG/DL (ref 70–99)
IGA SERPL-MCNC: 512 MG/DL (ref 64–422)
LABORATORY COMMENT REPORT: ABNORMAL
M PROTEIN SERPL ELPH-MCNC: ABNORMAL G/DL
POTASSIUM SERPL-SCNC: 4.1 MMOL/L (ref 3.5–5.2)
PROT SERPL-MCNC: 7.7 G/DL (ref 6–8.5)
PTH-INTACT SERPL-MCNC: 101 PG/ML (ref 15–65)
SODIUM SERPL-SCNC: 140 MMOL/L (ref 134–144)
TTG IGA SER-ACNC: <2 U/ML (ref 0–3)
TTG IGG SER-ACNC: <2 U/ML (ref 0–5)

## 2024-04-30 NOTE — PROGRESS NOTES
"Name: Kenzie Cali      : 1945      MRN: [id number]  Encounter Provider: Lashonda Flores DO  Encounter Date: 2024   Encounter department: [facility] PRIMARY CARE    Assessment & Plan     1. Type 2 diabetes mellitus with stage 3a chronic kidney disease, without long-term current use of insulin ([facility])  Lab Results   Component Value Date    HGBA1C 5.7 2023     On no meds for her diabetes.   Had hyperglycemia on recent labs. Reports eating 2 glazed donuts prior to labs.   2. Stage 3a chronic kidney disease ([facility])  Lab Results   Component Value Date    SODIUM 140 2024    K 4.1 2024     2024    CO2 17 (L) 2024    AGAP 10 2024    BUN 24 2024    CREATININE 1.01 (H) 2024    GLUC 145 (H) 2024    CALCIUM 10.2 2024    AST 67 (H) 2024    ALT 41 (H) 2024    ALKPHOS 67 2024    TP 7.7 2024    TBILI 0.5 2024    EGFR 57 (L) 2024     Stable.   3. Hypothyroidism, unspecified type  Lab Results   Component Value Date    TSH 1.080 2024     Continue same dose of synthroid  4. Chronic respiratory failure with hypoxia ([facility])  Follows with pulmonology  5. Interstitial lung disease ([facility])    6. Age-related osteoporosis without current pathological fracture  Was scheduled to initiate prolia today.   Will hold off given recent abnormality with SPEP and elevated PTH   7. Current moderate episode of major depressive disorder without prior episode ([facility])  Stable on lexapro  8. Hypercholesterolemia  Lab Results   Component Value Date    CHOLESTEROL 157 2023     Lab Results   Component Value Date    HDL 33 (L) 2023     Lab Results   Component Value Date    TRIG 120 2023     No results found for: \"NONHDLC\"  On atorvastatin and tolerating well.   9. Transaminasemia  AST and ALT remain elevated.   10. Abnormal SPEP  Reviewed her SPEP and this showed elevated beta globulin.   Refer to endocrinology. (She " "was referred previously but never went.)  11. Hyperparathyroidism (HCC)  PTH elevated on recent labs. Likely secondary hyperparathryoidism from her CKD.   Refer to endocrinology.   11. Rash  Erythematous 1 cm macules scattered on bilateral lower extremities.  ? Etiology. No new medications.   Asked her to have her dermatologist send copy of his consultation to our office.   12. Chronic low back pain.    Xray in January showed Mild apex right scoliosis centered at L2.  Lumbar degenerative changes.  Aortic atherosclerotic disease.    Recommend tylenol.   Did PT.   Wants to see \"back doctor\".   Has previously been referred to pain management but never went.   Referral placed.     Subjective     HPI  Pt is a 78 year old female with chronic medical problems as noted below who is being seen today for f/u and review of labs.   She was scheduled to have her prolia injection today after workup for secondary causes of her osteoporosis.   Her PTH was elevated. Calcium normal and Vitamin D normal.   Her SPEP showed elevation of beta globulin.     Her breathing is unchanged . Seeing pulmonology for this.     Has a rash on lower legs. Started earlier this week. Not itchy. Planning to make dermatology appt. Sees Dr Kalpesh Gibson.         Patient Active Problem List   Diagnosis   • Pulmonary fibrosis (HCC)   • Type 2 diabetes mellitus with chronic kidney disease, without long-term current use of insulin (HCC)--  Lab Results   Component Value Date    HGBA1C 5.7 12/06/2023        • History of DVT of lower extremity   • ROHIT positive   • Anticoagulated   • Diverticulosis   • History of pulmonary embolism   • Hypercholesterolemia   • Interstitial lung disease (HCC)   • Obesity   • Current moderate episode of major depressive disorder without prior episode (HCC)   • Osteoarthritis of both knees   • Low back pain potentially associated with radiculopathy   • Chronic respiratory failure with hypoxia (HCC)--patient following with pulmonology.  "   • Hypothyroidism--taking synthroid 150 mcg daily.   Lab Results   Component Value Date    TSH 1.080 04/12/2024        • CKD (chronic kidney disease) stage 3, GFR 30-59 ml/min (HCC)   • Transaminasemia--labs on 4/12/24 show persistent mild elevation of her AST and ALT at 67 and 41 respectively. Had US done which showed fatty liver.    • Urinary incontinence   • Ambulatory dysfunction   • Age-related osteoporosis without current pathological fracture--patient was seen in February to discuss her dexa scan. Workup for secondary causes of osteoporosis initiated.   Her PTH was elevated at 101. Calcium and vitamin D levels were normal. Celiac screen negative.   Her SPEP showed elevation of beta globulin.   • Fatty liver   • Atherosclerosis of aorta (HCC)   • Coronary atherosclerosis   • SOB (shortness of breath)       Review of Systems    Past Medical History:   Diagnosis Date   • Cataract 03/2023   • Deep vein thrombosis (HCC)    • Diabetes mellitus (HCC)    • Diverticulitis    • Hypertension    • Hypothyroid    • Osteoarthritis    • Pneumonia    • Pulmonary fibrosis (HCC)      Past Surgical History:   Procedure Laterality Date   • CATARACT EXTRACTION     • CHOLECYSTECTOMY     • SKIN CANCER EXCISION       Family History   Problem Relation Age of Onset   • Breast cancer Mother    • Heart disease Father      Social History     Socioeconomic History   • Marital status: /Civil Union     Spouse name: None   • Number of children: None   • Years of education: None   • Highest education level: None   Occupational History   • None   Tobacco Use   • Smoking status: Never   • Smokeless tobacco: Never   Vaping Use   • Vaping status: Never Used   Substance and Sexual Activity   • Alcohol use: Not Currently   • Drug use: Never   • Sexual activity: Not Currently     Partners: Male   Other Topics Concern   • None   Social History Narrative     since 1979    Has son and daughter     Lives alone in an apartment     Social  "Determinants of Health     Financial Resource Strain: Not on file   Food Insecurity: Not on file   Transportation Needs: Not on file   Physical Activity: Not on file   Stress: Not on file   Social Connections: Not on file   Intimate Partner Violence: Not on file   Housing Stability: Not on file     Current Outpatient Medications on File Prior to Visit   Medication Sig   • amLODIPine (NORVASC) 10 mg tablet Take 10 mg by mouth daily   • apixaban (Eliquis) 5 mg Take 5 mg by mouth 2 (two) times a day   • atorvastatin (LIPITOR) 20 mg tablet Take 20 mg by mouth daily   • escitalopram (LEXAPRO) 20 mg tablet Take 20 mg by mouth daily at bedtime   • levothyroxine (Synthroid) 150 mcg tablet Take 1 tablet (150 mcg total) by mouth daily in the early morning   • omeprazole (PriLOSEC) 20 mg delayed release capsule Take 20 mg by mouth as needed   • denosumab (PROLIA) 60 mg/mL Inject 1 mL (60 mg total) under the skin once for 1 dose   • [DISCONTINUED] tamsulosin (FLOMAX) 0.4 mg Take 0.4 mg by mouth daily with dinner (Patient not taking: Reported on 5/1/2024)     No Known Allergies  Immunization History   Administered Date(s) Administered   • COVID-19 PFIZER VACCINE 0.3 ML IM 03/16/2021, 04/06/2021   • Influenza, high dose seasonal 0.7 mL 09/23/2021   • Pneumococcal Conjugate Vaccine 20-valent (Pcv20), Polysace 10/28/2022   • Pneumococcal Polysaccharide PPV23 09/01/2021   • Td (adult), adsorbed 07/18/2022       Objective     /60 (BP Location: Left arm, Patient Position: Sitting, Cuff Size: Adult)   Pulse 73   Temp 98.8 °F (37.1 °C) (Tympanic)   Ht 5' 2\" (1.575 m)   Wt 79.8 kg (176 lb)   LMP  (LMP Unknown)   SpO2 98%   BMI 32.19 kg/m²     Physical Exam  Vitals and nursing note reviewed.   Constitutional:       General: She is not in acute distress.     Appearance: Normal appearance. She is obese. She is not ill-appearing, toxic-appearing or diaphoretic.      Comments: Ambulates with walker   HENT:      Head: " Normocephalic.   Eyes:      Conjunctiva/sclera: Conjunctivae normal.   Neck:      Vascular: No carotid bruit.   Cardiovascular:      Rate and Rhythm: Normal rate and regular rhythm.      Heart sounds: No murmur heard.  Pulmonary:      Breath sounds: No wheezing, rhonchi or rales.      Comments: Patient with increased work of breathing  Abdominal:      General: Abdomen is flat. Bowel sounds are normal.      Palpations: Abdomen is soft.   Musculoskeletal:      Cervical back: Normal range of motion and neck supple.      Right lower leg: No edema.      Left lower leg: No edema.   Lymphadenopathy:      Cervical: No cervical adenopathy.   Skin:     Comments: 1 cm erythematous macules scattered over bilateral lower extremities anteriorly. No rash elsewhere   Neurological:      General: No focal deficit present.      Mental Status: She is alert and oriented to person, place, and time.   Psychiatric:         Mood and Affect: Mood normal.       Lashonda Flores,

## 2024-05-01 ENCOUNTER — OFFICE VISIT (OUTPATIENT)
Dept: FAMILY MEDICINE CLINIC | Facility: CLINIC | Age: 79
End: 2024-05-01
Payer: COMMERCIAL

## 2024-05-01 VITALS
DIASTOLIC BLOOD PRESSURE: 60 MMHG | TEMPERATURE: 98.8 F | HEART RATE: 73 BPM | BODY MASS INDEX: 32.39 KG/M2 | HEIGHT: 62 IN | SYSTOLIC BLOOD PRESSURE: 126 MMHG | WEIGHT: 176 LBS | OXYGEN SATURATION: 98 %

## 2024-05-01 DIAGNOSIS — E21.3 HYPERPARATHYROIDISM (HCC): ICD-10-CM

## 2024-05-01 DIAGNOSIS — E03.9 HYPOTHYROIDISM, UNSPECIFIED TYPE: ICD-10-CM

## 2024-05-01 DIAGNOSIS — N18.31 TYPE 2 DIABETES MELLITUS WITH STAGE 3A CHRONIC KIDNEY DISEASE, WITHOUT LONG-TERM CURRENT USE OF INSULIN (HCC): Primary | ICD-10-CM

## 2024-05-01 DIAGNOSIS — R74.01 TRANSAMINASEMIA: ICD-10-CM

## 2024-05-01 DIAGNOSIS — M54.50 CHRONIC BILATERAL LOW BACK PAIN WITHOUT SCIATICA: ICD-10-CM

## 2024-05-01 DIAGNOSIS — N18.31 STAGE 3A CHRONIC KIDNEY DISEASE (HCC): ICD-10-CM

## 2024-05-01 DIAGNOSIS — R77.8 ABNORMAL SPEP: ICD-10-CM

## 2024-05-01 DIAGNOSIS — E78.00 HYPERCHOLESTEROLEMIA: ICD-10-CM

## 2024-05-01 DIAGNOSIS — J84.9 INTERSTITIAL LUNG DISEASE (HCC): ICD-10-CM

## 2024-05-01 DIAGNOSIS — F32.1 CURRENT MODERATE EPISODE OF MAJOR DEPRESSIVE DISORDER WITHOUT PRIOR EPISODE (HCC): ICD-10-CM

## 2024-05-01 DIAGNOSIS — M81.0 AGE-RELATED OSTEOPOROSIS WITHOUT CURRENT PATHOLOGICAL FRACTURE: ICD-10-CM

## 2024-05-01 DIAGNOSIS — E11.22 TYPE 2 DIABETES MELLITUS WITH STAGE 3A CHRONIC KIDNEY DISEASE, WITHOUT LONG-TERM CURRENT USE OF INSULIN (HCC): Primary | ICD-10-CM

## 2024-05-01 DIAGNOSIS — J96.11 CHRONIC RESPIRATORY FAILURE WITH HYPOXIA (HCC): ICD-10-CM

## 2024-05-01 DIAGNOSIS — G89.29 CHRONIC BILATERAL LOW BACK PAIN WITHOUT SCIATICA: ICD-10-CM

## 2024-05-01 PROCEDURE — 3725F SCREEN DEPRESSION PERFORMED: CPT | Performed by: FAMILY MEDICINE

## 2024-05-01 PROCEDURE — 99214 OFFICE O/P EST MOD 30 MIN: CPT | Performed by: FAMILY MEDICINE

## 2024-05-01 PROCEDURE — 1159F MED LIST DOCD IN RCRD: CPT | Performed by: FAMILY MEDICINE

## 2024-05-01 PROCEDURE — G2211 COMPLEX E/M VISIT ADD ON: HCPCS | Performed by: FAMILY MEDICINE

## 2024-05-01 PROCEDURE — 1160F RVW MEDS BY RX/DR IN RCRD: CPT | Performed by: FAMILY MEDICINE

## 2024-05-01 NOTE — PATIENT INSTRUCTIONS
Please see both hematology for the abnormal SPEP and the endocrinologist for your elevated parathyroid hormone.   A referral has also been placed to the pain specialist for your low back pain.   You can stop the flomax.   Get labs before next visit.

## 2024-05-06 ENCOUNTER — TELEPHONE (OUTPATIENT)
Age: 79
End: 2024-05-06

## 2024-05-06 NOTE — TELEPHONE ENCOUNTER
Okay for note stating that due to her chronic respiratory failure, she requires her aide to accompany her during transportation to office visits.

## 2024-05-06 NOTE — LETTER
May 7, 2024     Patient: Kenzie Cali  YOB: 1945        To Whom it May Concern:    Kenzie Cali is under my professional care. Due to patient chronic respiratory failure, she requires her aide to accompany her during transportation to office visits.      If you have any questions or concerns, please don't hesitate to call.         Sincerely,          Lashonda Flores, DO.

## 2024-05-06 NOTE — TELEPHONE ENCOUNTER
Melia , from [facility], had called in with patient Kenzie as well, on the other end.     Patient is looking to start using [facility] transportation, and will need a note from Daria Flores, stating that patient will need a use of an Aid, during transportation rides.     If any additional questions please reach back to Melia, patient's  at [phone].    Fax Note too: [phone] ATTENTION: Melia    Or     Email: [email or web address]

## 2024-05-08 ENCOUNTER — TELEPHONE (OUTPATIENT)
Age: 79
End: 2024-05-08

## 2024-05-08 NOTE — TELEPHONE ENCOUNTER
Patient was seen on 5/1/24 and we discussed her labs that were done for workup of secondary causes of osteoporosis. Her PTH was elevated and her SPEP was abnormal.   Referred her to see both endocrinology for the abnormal PTH/osteoporosis and to hematology for the abnormal SPEP. The prolia is being held until these evaluations are complete. If endocrinology feels that she is okay for prolia, then we can initiate.

## 2024-05-08 NOTE — TELEPHONE ENCOUNTER
Natacha from [facility] contacted the office this morning to provide provider an update. Natacha states that the patient came in today for her prescribed denosumab (PROLIA) 60 mg/mL and due to copay did not fill this medication at this time. Patient informed Natacha that our office has this medication in the refrigerator and she would get it from us. Please review and advise patient.

## 2024-05-08 NOTE — TELEPHONE ENCOUNTER
Called patient leaving patient a voice mail to call back    Health Maintenance Summary     Topic Due On Due Status Completed On    Colorectal Cancer Screening - Colonoscopy Nov 30, 2026 Not Due Nov 30, 2016    Immunization - Td/Tdap Aug 13, 2024 Not Due Aug 13, 2014    Diabetes Eye Exam Jun 8, 1981 Overdue     Glycohemoglobin A1C  (Diabetes Sugar)  Jul 6, 2017 Not Due Jan 6, 2017    Microalbumin  (Diabetes Urine Test)  Jan 6, 2018 Not Due Jan 6, 2017    GFR  (Kidney Function Test)  Jan 6, 2018 Not Due Jan 6, 2017    Diabetes Foot Exam  Sep 26, 2017 Not Due Sep 26, 2016    Immunization-Influenza  Completed Oct 26, 2016          Patient is up to date, no discussion needed .

## 2024-05-09 ENCOUNTER — TELEPHONE (OUTPATIENT)
Age: 79
End: 2024-05-09

## 2024-05-09 NOTE — TELEPHONE ENCOUNTER
Pt called in to schedule an appt. Pt is now scheduled to see Dr. Rolle on 07/02/2024. Pt is aware and confirmed

## 2024-05-28 ENCOUNTER — CONSULT (OUTPATIENT)
Dept: PAIN MEDICINE | Facility: CLINIC | Age: 79
End: 2024-05-28
Payer: COMMERCIAL

## 2024-05-28 VITALS — SYSTOLIC BLOOD PRESSURE: 118 MMHG | TEMPERATURE: 98.2 F | HEART RATE: 88 BPM | DIASTOLIC BLOOD PRESSURE: 62 MMHG

## 2024-05-28 DIAGNOSIS — M54.50 CHRONIC BILATERAL LOW BACK PAIN WITHOUT SCIATICA: ICD-10-CM

## 2024-05-28 DIAGNOSIS — M47.816 LUMBAR SPONDYLOSIS: Primary | ICD-10-CM

## 2024-05-28 DIAGNOSIS — G89.29 CHRONIC BILATERAL LOW BACK PAIN WITHOUT SCIATICA: ICD-10-CM

## 2024-05-28 DIAGNOSIS — Z79.01 CHRONIC ANTICOAGULATION: ICD-10-CM

## 2024-05-28 PROCEDURE — 99204 OFFICE O/P NEW MOD 45 MIN: CPT | Performed by: ANESTHESIOLOGY

## 2024-05-28 PROCEDURE — G2211 COMPLEX E/M VISIT ADD ON: HCPCS | Performed by: ANESTHESIOLOGY

## 2024-05-28 NOTE — PROGRESS NOTES
Assessment  1. Lumbar spondylosis    2. Chronic bilateral low back pain without sciatica    3. Chronic anticoagulation        Plan      At this point the patient's pain persists despite time, relative rest, activity modification, and nonsteroidal anti-inflammatories. Her pain is significantly interfering with her daily living activities.  She has undergone chiropractic treatment.  It is appropriate to order MRI of the lumbar spine to evaluate any significant etiology of her symptoms.    I will start her on a course of physical therapy, stabilization as well as strengthening, prescription was provided.    Once we obtain MRI results, I will follow-up with the patient, review the results and current symptoms, and discuss the next steps of the treatment plan.    My impressions and treatment recommendations were discussed in detail with the patient who verbalized understanding and had no further questions.  Discharge instructions were provided. I personally saw and examined the patient and I agree with the above discussed plan of care.  This note is created using dictation transcription.  It may contain typographical errors, grammatical errors, improperly dictated words, background noise and other errors.    Orders Placed This Encounter   Procedures    MRI lumbar spine without contrast     Standing Status:   Future     Standing Expiration Date:   5/28/2028     Scheduling Instructions:      There is no preparation for this test. Please leave your jewelry and valuables at home, wedding rings are the exception. All patients will be required to change into a hospital gown and pants.  Street clothes are not permitted in the MRI.  Magnetic nail polish must be removed prior to arrival for your test. Please bring your insurance cards, a form of photo ID and a list of your medications with you. Arrive 15 minutes prior to your appointment time in order to register. Please bring any prior CT or MRI studies of this area that were  "not performed at a [facility].            To schedule this appointment, please contact Central Scheduling at [phone].            Prior to your appointment, please make sure you complete the MRI Screening Form when you e-Check in for your appointment. This will be available starting 7 days before your appointment in [facility]. You may receive an e-mail with an activation code if you do not have a [facility] account. If you do not have access to a device, we will complete your screening at your appointment.     Order Specific Question:   What is the patient's sedation requirement?     Answer:   No Sedation     Order Specific Question:   Does this procedure require the 3T MRI at [place] or [place]?     Answer:   No     Order Specific Question:   Release to patient through [facility]     Answer:   Immediate     Order Specific Question:   Is order priority selected as STAT?     Answer:   No     Order Specific Question:   Reason for Exam     Answer:   low back pain    Ambulatory referral to Physical Therapy     Standing Status:   Future     Standing Expiration Date:   5/28/2025     Referral Priority:   Routine     Referral Type:   Physical Therapy     Referral Reason:   Specialty Services Required     Requested Specialty:   Physical Therapy     Number of Visits Requested:   1     Expiration Date:   5/28/2025     No orders of the defined types were placed in this encounter.      History of Present Illness    Kenzie Cali is a 78 y.o. female with 5-year history of low back pain but gradually worsening.  She states she does not get out of bed secondary to severity of her pain.  In the past she underwent chiropractic treatment.  She states she has had \"needles in her back\" years ago without relief.    She underwent chiropractic treatment with exacerbation of her symptoms.  She reports pain is severe rates it as 10 out of 10 the visual analog scale is intermittent but worse in the morning described as burning.  She has " subjective weakness of her lower limbs and utilizes a walker for ambulation.  Standing and walking increase her symptoms.    I have personally reviewed and/or updated the patient's past medical history, past surgical history, family history, social history, current medications, allergies, and vital signs today.     Review of Systems   Constitutional:  Negative for fever and unexpected weight change.   HENT:  Negative for trouble swallowing.    Eyes:  Negative for visual disturbance.   Respiratory:  Positive for cough and shortness of breath. Negative for wheezing.    Cardiovascular:  Negative for chest pain and palpitations.   Gastrointestinal:  Negative for constipation, diarrhea, nausea and vomiting.   Endocrine: Negative for cold intolerance, heat intolerance and polydipsia.   Genitourinary:  Negative for difficulty urinating and frequency.   Musculoskeletal:  Positive for myalgias. Negative for arthralgias, gait problem and joint swelling.   Skin:  Negative for rash.   Neurological:  Negative for dizziness, seizures, syncope, weakness and headaches.   Hematological:  Does not bruise/bleed easily.   Psychiatric/Behavioral:  Negative for dysphoric mood.    All other systems reviewed and are negative.      Patient Active Problem List   Diagnosis    Pulmonary fibrosis (HCC)    Type 2 diabetes mellitus with chronic kidney disease, without long-term current use of insulin ([facility])    History of DVT of lower extremity    ROHIT positive    Anticoagulated    Diverticulosis    History of pulmonary embolism    Hypercholesterolemia    Interstitial lung disease ([facility])    Obesity    Current moderate episode of major depressive disorder without prior episode ([facility])    Osteoarthritis of both knees    Low back pain potentially associated with radiculopathy    Chronic respiratory failure with hypoxia ([facility])    Hypothyroidism    CKD (chronic kidney disease) stage 3, GFR 30-59 ml/min ([facility])    Transaminasemia    Urinary incontinence     Ambulatory dysfunction    Age-related osteoporosis without current pathological fracture    Fatty liver    Atherosclerosis of aorta (HCC)    Coronary atherosclerosis    SOB (shortness of breath)       Past Medical History:   Diagnosis Date    Arthritis     Cataract 03/2023    Deep vein thrombosis (HCC)     Diabetes mellitus (HCC)     Diverticulitis     Hypertension     Hypothyroid     Osteoarthritis     Pneumonia     Pulmonary fibrosis (HCC)        Past Surgical History:   Procedure Laterality Date    CATARACT EXTRACTION      CHOLECYSTECTOMY      SKIN CANCER EXCISION         Family History   Problem Relation Age of Onset    Breast cancer Mother     Heart disease Father        Social History     Occupational History    Not on file   Tobacco Use    Smoking status: Never    Smokeless tobacco: Never   Vaping Use    Vaping status: Never Used   Substance and Sexual Activity    Alcohol use: Not Currently    Drug use: Never    Sexual activity: Not Currently     Partners: Male       Current Outpatient Medications on File Prior to Visit   Medication Sig    amLODIPine (NORVASC) 10 mg tablet Take 10 mg by mouth daily    apixaban (Eliquis) 5 mg Take 5 mg by mouth 2 (two) times a day    atorvastatin (LIPITOR) 20 mg tablet Take 20 mg by mouth daily    escitalopram (LEXAPRO) 20 mg tablet Take 20 mg by mouth daily at bedtime    levothyroxine (Synthroid) 150 mcg tablet Take 1 tablet (150 mcg total) by mouth daily in the early morning    omeprazole (PriLOSEC) 20 mg delayed release capsule Take 20 mg by mouth as needed    denosumab (PROLIA) 60 mg/mL Inject 1 mL (60 mg total) under the skin once for 1 dose     No current facility-administered medications on file prior to visit.       No Known Allergies    Physical Exam    /62 (BP Location: Left arm, Patient Position: Sitting, Cuff Size: Standard)   Pulse 88   Temp 98.2 °F (36.8 °C)   LMP  (LMP Unknown)     Constitutional: normal, well developed, well nourished, alert, in no  distress and non-toxic and no overt pain behavior. and overweight  Eyes: anicteric  HEENT: grossly intact  Neck: supple, symmetric, trachea midline and no masses   Pulmonary:even and unlabored  Cardiovascular:No edema or pitting edema present  Skin:Normal without rashes or lesions and well hydrated  Psychiatric:Mood and affect appropriate  Neurologic:Cranial Nerves II-XII grossly intact  Musculoskeletal: Ambulates with a walker, difficulty going from sitting to standing sitting position there is significant tenderness in the lumbar spinous process no sacroiliac tenderness, deep tendon reflexes diminished but symmetrical, negative bilateral straight leg raising, no focal motor deficit appreciated lower limbs    Imaging  LUMBAR SPINE @  1-21-24     INDICATION:   M54.50: Low back pain, unspecified.     COMPARISON:  None     VIEWS:  XR SPINE LUMBAR MINIMUM 4 VIEWS NON INJURY        FINDINGS:     There are 5 non rib bearing lumbar vertebral bodies.     There is no evidence of acute fracture or destructive osseous lesion.     Mild apex right scoliotic deformity is noted, centered at the L2.  Alignment is otherwise unremarkable.     Moderate lumbar degenerative changes.     The pedicles appear intact.     There are atherosclerotic calcifications. Soft tissues are otherwise unremarkable.     Cholecystectomy clips.     IMPRESSION:     Mild apex right scoliosis centered at L2.  Lumbar degenerative changes.  Aortic atherosclerotic disease.    I have personally reviewed pertinent films in PACS and my interpretation is low lumbar spondylosis.

## 2024-06-18 DIAGNOSIS — E03.9 HYPOTHYROIDISM, UNSPECIFIED TYPE: ICD-10-CM

## 2024-06-18 DIAGNOSIS — E03.9 HYPOTHYROIDISM, UNSPECIFIED TYPE: Primary | ICD-10-CM

## 2024-06-18 RX ORDER — OMEPRAZOLE 20 MG/1
20 CAPSULE, DELAYED RELEASE ORAL AS NEEDED
Qty: 90 CAPSULE | Refills: 1 | Status: SHIPPED | OUTPATIENT
Start: 2024-06-18 | End: 2024-06-19

## 2024-06-18 RX ORDER — LEVOTHYROXINE SODIUM 0.15 MG/1
150 TABLET ORAL
Qty: 30 TABLET | Refills: 5 | Status: SHIPPED | OUTPATIENT
Start: 2024-06-18

## 2024-06-18 RX ORDER — AMLODIPINE BESYLATE 10 MG/1
10 TABLET ORAL DAILY
Qty: 90 TABLET | Refills: 1 | Status: SHIPPED | OUTPATIENT
Start: 2024-06-18

## 2024-06-18 NOTE — TELEPHONE ENCOUNTER
Medication: levothyroxine (Synthroid) 150 mcg tablet     Dose/Frequency:  Take 1 tablet (150 mcg total) by mouth daily in the early morning,     Quantity: 30    Pharmacy: [facility]/pharmacy [address] [place] [address], [address]     Office:   [x] PCP/Provider -  Lashonda Flores. DO  [] Speciality/Provider -     Does the patient have enough for 3 days?   [x] Yes   [] No - Send as HP to POD     Medication: amLODIPine (NORVASC) 10 mg tablet     Dose/Frequency: Take 10 mg by mouth daily     Quantity: 30    Pharmacy:  [facility]/pharmacy [address] [place] [address], [address]       Office:   [x] PCP/Provider - Lashonda Flores. DO  [] Speciality/Provider -     Does the patient have enough for 3 days?   [x] Yes   [] No - Send as HP to POD    Medication: omeprazole (PriLOSEC) 20 mg delayed release capsule     Dose/Frequency: Take 20 mg by mouth as needed     Quantity: 30    Pharmacy: [facility]/pharmacy [address] [place] [address], [address]       Office:   [x] PCP/Provider - Lashonda Flores. DO  [] Speciality/Provider -     Does the patient have enough for 3 days?   [x] Yes   [] No - Send as HP to POD

## 2024-06-19 ENCOUNTER — HOSPITAL ENCOUNTER (OUTPATIENT)
Dept: PULMONOLOGY | Facility: HOSPITAL | Age: 79
Discharge: HOME | End: 2024-06-19
Attending: INTERNAL MEDICINE
Payer: COMMERCIAL

## 2024-06-19 DIAGNOSIS — J84.10 PULMONARY FIBROSIS (HCC): ICD-10-CM

## 2024-06-19 PROCEDURE — 94618 PULMONARY STRESS TESTING: CPT

## 2024-06-19 PROCEDURE — 94726 PLETHYSMOGRAPHY LUNG VOLUMES: CPT

## 2024-06-19 PROCEDURE — 94618 PULMONARY STRESS TESTING: CPT | Performed by: INTERNAL MEDICINE

## 2024-06-19 PROCEDURE — 94060 EVALUATION OF WHEEZING: CPT

## 2024-06-19 PROCEDURE — 94726 PLETHYSMOGRAPHY LUNG VOLUMES: CPT | Performed by: INTERNAL MEDICINE

## 2024-06-19 PROCEDURE — 94729 DIFFUSING CAPACITY: CPT

## 2024-06-19 PROCEDURE — 94060 EVALUATION OF WHEEZING: CPT | Performed by: INTERNAL MEDICINE

## 2024-06-19 PROCEDURE — 94729 DIFFUSING CAPACITY: CPT | Performed by: INTERNAL MEDICINE

## 2024-06-19 RX ORDER — ALBUTEROL SULFATE 2.5 MG/3ML
2.5 SOLUTION RESPIRATORY (INHALATION) ONCE
Status: COMPLETED | OUTPATIENT
Start: 2024-06-19 | End: 2024-06-19

## 2024-06-19 RX ORDER — OMEPRAZOLE 20 MG/1
20 CAPSULE, DELAYED RELEASE ORAL AS NEEDED
Qty: 90 CAPSULE | Refills: 1 | Status: SHIPPED | OUTPATIENT
Start: 2024-06-19

## 2024-06-19 RX ADMIN — ALBUTEROL SULFATE 2.5 MG: 2.5 SOLUTION RESPIRATORY (INHALATION) at 13:54

## 2024-06-20 ENCOUNTER — HOSPITAL ENCOUNTER (OUTPATIENT)
Dept: MRI IMAGING | Facility: HOSPITAL | Age: 79
End: 2024-06-20
Attending: ANESTHESIOLOGY
Payer: COMMERCIAL

## 2024-06-20 DIAGNOSIS — G89.29 CHRONIC BILATERAL LOW BACK PAIN WITHOUT SCIATICA: ICD-10-CM

## 2024-06-20 DIAGNOSIS — M54.50 CHRONIC BILATERAL LOW BACK PAIN WITHOUT SCIATICA: ICD-10-CM

## 2024-06-20 PROCEDURE — 72148 MRI LUMBAR SPINE W/O DYE: CPT

## 2024-07-01 NOTE — PROGRESS NOTES
Kenzie Cali 78 y.o. female MRN: [id number]    Encounter: [id number]      Assessment & Plan     Assessment:  This is a 78 y.o.-year-old female with osteoporosis, presenting in evaluation for elevated PTH level.  Suspect secondary hyperparathyroidism as patient does have chronic kidney disease and minimal dietary calcium intake besides ice cream and mac & cheese.     Plan:  --recommend calcium 1200mg daily. Patient with minimal calcium intake, mostly ice cream and mac & cheese.  Provided patient with a  chart of calcium rich foods which she will try to incorporate more.  Did also recommend that she start a calcium supplement like Viactiv as she would likely tolerate a chewable better than a pill with her chronic cough and pulmonary fibrosis.    --Have asked patient to repeat PTH in 6 to 8 weeks on adequate calcium intake  -- Despite relatively low vitamin D intake, levels adequate at 41  -- Patient does have Prolia ordered through primary.  No endocrine contraindication to starting this however would recommend patient first have adequate dietary calcium intake as hypocalcemia is a potential side effect of Prolia especially with poor intake  --Patient would prefer to keep obtaining Prolia through primary has she is limited by transportation and that is closer  -- Follow-up as needed    CC: hyperparathyroidism     History of Present Illness     HPI:  78 y.o. female with past medical history significant for CAD, ILD, chronic respiratory failure, hepatic steatosis, DM2, hypothyroid, CKD 3, depression, obesity, DVT/PE, presenting in consult for osteoporosis. Patient though she's here for the prolia shot. Reports everything started after covid shot, then got covid, then pulmonary fibrosis, then PNA then blood clots at rehab.     No calcium supplement, no vitamins. Get ice cream often, eats in a few days. Not much dairy otherwise. Gets moms meals, no greens anymore . [place] once a month. Likes mac and cheese and grilled  cheese.     DXA: 1/17/2024 osteoporosis based off left femoral neck T-score -2.8, FRAX 10-year hip 12%, 10-year total 29%  Lumbar MRI 6/20/2024 w degenerative changes no fx    4/24 calcium 10.2 (10.1 corrected)  4/12/2024 , Vit D 41.1, , SPEP elevated beta gliblin    Osteoporosis first diagnosed: 2024  Fractures:no  Height loss: 1 inch   Sister w osteoporosis.  Menopause:47 HRT: no  History of RA: -- Steroid use:yes for pulmonary fibrosis   Anticonvulsants:no  PPI:yes   Alcohol:no Smoking:no   Calcium intake: low  Vitamin D intake:low, but adequate Vit D levels  Dental disease: bridges, reports no active issues but should see dentist more  Falls:yes w PNA  Exercise:limited by pulmonary fibross , weakness  Prior treatment:no       Review of Systems   Constitutional:  Negative for activity change, appetite change and unexpected weight change.   Respiratory:  Positive for choking and shortness of breath.    Skin:  Negative for pallor and rash.       Historical Information   Past Medical History:   Diagnosis Date    Arthritis     Cataract 03/2023    Deep vein thrombosis (HCC)     Diabetes mellitus (HCC)     Diverticulitis     Hypertension     Hypothyroid     Osteoarthritis     Pneumonia     Pulmonary fibrosis (HCC)      Past Surgical History:   Procedure Laterality Date    CATARACT EXTRACTION      CHOLECYSTECTOMY      SKIN CANCER EXCISION       Social History   Social History     Substance and Sexual Activity   Alcohol Use Not Currently     Social History     Substance and Sexual Activity   Drug Use Never     Social History     Tobacco Use   Smoking Status Never   Smokeless Tobacco Never     Family History:   Family History   Problem Relation Age of Onset    Breast cancer Mother     Heart disease Father        Meds/Allergies   Current Outpatient Medications   Medication Sig Dispense Refill    amLODIPine (NORVASC) 10 mg tablet Take 1 tablet (10 mg total) by mouth daily 90 tablet 1    apixaban (Eliquis) 5 mg  "Take 5 mg by mouth 2 (two) times a day      atorvastatin (LIPITOR) 20 mg tablet Take 20 mg by mouth daily      escitalopram (LEXAPRO) 20 mg tablet Take 20 mg by mouth daily at bedtime      levothyroxine (Synthroid) 150 mcg tablet Take 1 tablet (150 mcg total) by mouth daily in the early morning 30 tablet 5    omeprazole (PriLOSEC) 20 mg delayed release capsule TAKE 1 CAPSULE (20 MG TOTAL) BY MOUTH AS NEEDED (AS NEEDED) 90 capsule 1    denosumab (PROLIA) 60 mg/mL Inject 1 mL (60 mg total) under the skin once for 1 dose 1 mL 0     No current facility-administered medications for this visit.     No Known Allergies    Objective   Vitals: Blood pressure 120/70, pulse (!) 51, height 5' 2\" (1.575 m), weight 80.7 kg (178 lb), SpO2 99%.    Physical Exam  Constitutional:       General: She is not in acute distress.     Appearance: Normal appearance. She is not ill-appearing, toxic-appearing or diaphoretic.      Comments: +walker, frail   HENT:      Head: Normocephalic and atraumatic.      Nose: Nose normal.   Eyes:      Extraocular Movements: Extraocular movements intact.      Conjunctiva/sclera: Conjunctivae normal.      Pupils: Pupils are equal, round, and reactive to light.   Pulmonary:      Effort: Pulmonary effort is normal. No respiratory distress.   Abdominal:      General: There is no distension.   Musculoskeletal:         General: No deformity.      Right lower leg: No edema.      Left lower leg: No edema.   Skin:     General: Skin is warm and dry.   Neurological:      General: No focal deficit present.      Mental Status: She is alert. Mental status is at baseline.   Psychiatric:         Mood and Affect: Mood normal.         Behavior: Behavior normal.         Thought Content: Thought content normal.         The history was obtained from the review of the chart, patient.    Lab Results:   Lab Results   Component Value Date/Time    Potassium 4.1 04/12/2024 10:52 AM    Potassium 4.0 04/09/2024 05:12 PM    Potassium 4.1 " 12/13/2023 10:44 AM    Chloride 103 04/12/2024 10:52 AM    Chloride 108 04/09/2024 05:12 PM    Chloride 104 12/13/2023 10:44 AM    CO2 17 (L) 04/12/2024 10:52 AM    CO2 23 04/09/2024 05:12 PM    CO2 16 (L) 12/13/2023 10:44 AM    BUN 24 04/12/2024 10:52 AM    BUN 21 04/09/2024 05:12 PM    BUN 17 12/13/2023 10:44 AM    Creatinine 1.01 (H) 04/12/2024 10:52 AM    Creatinine 1.19 04/09/2024 05:12 PM    Creatinine 1.18 (H) 12/13/2023 10:44 AM    Calcium 10.2 04/09/2024 05:12 PM    eGFR 57 (L) 04/12/2024 10:52 AM    eGFR 43 04/09/2024 05:12 PM    eGFR 47 (L) 12/13/2023 10:44 AM         Imaging Studies:         Results for orders placed during the hospital encounter of 01/17/24    DXA bone density spine hip and pelvis    Impression  1. Osteoporosis. Based on the left femoral neck    2.  The 10 year risk of hip fracture is 12% with the 10 year risk of major osteoporotic fracture being 29% as calculated by the [employer] fracture risk assessment tool (FRAX, which is based on data generated by the WHO Collaborating [place]  for Metabolic Bone Diseases).    3.  The current NOF guidelines recommend treating patients with a T-score of -2.5 or less in the lumbar spine or hips, or in post-menopausal women and men over the age of 50 with low bone mass (osteopenia) and a FRAX 10 year risk score of >3% for hip  fracture and/or >20% for major osteoporotic fracture.    4.  The NOF recommends follow-up DXA in 1-2 years after initiating therapy for osteoporosis and every 2 years thereafter. More frequent evaluation is appropriate for patients with conditions associated with rapid bone loss, such as glucocorticoid  therapy. The interval between DXA screenings may be longer for individuals without major risk factors and initial T-score in the normal or upper low bone mass range.      The FRAX algorithm has certain limitations:  -FRAX has not been validated in patients currently or previously treated with pharmacotherapy for  "osteoporosis.  In such patients, clinical judgment must be exercised in interpreting FRAX scores.  -Prior hip, vertebral and humeral fragility fractures appear to confer greater risk of subsequent fracture than fractures at other sites (this is especially true for individuals with severe vertebral fractures), but quantification of this incremental  risk is not possible with FRAX.  -FRAX underestimates fracture risk in patients with history of multiple fragility fractures.  -FRAX may underestimate fracture risk in patients with history of frequent falls.  -It is not appropriate to use FRAX to monitor treatment response.      WHO CLASSIFICATION:  Normal (a T-score of -1.0 or higher)  Low bone mineral density (a T-score of less than -1.0 but higher than -2.5)  Osteoporosis (a T-score of -2.5 or less)  Severe osteoporosis (a T-score of -2.5 or less with a fragility fracture)              Workstation performed: [id number]            I have personally reviewed pertinent reports.      Portions of the record may have been created with voice recognition software. Occasional wrong word or \"sound a like\" substitutions may have occurred due to the inherent limitations of voice recognition software. Read the chart carefully and recognize, using context, where substitutions have occurred.    "

## 2024-07-02 ENCOUNTER — CONSULT (OUTPATIENT)
Dept: ENDOCRINOLOGY | Facility: CLINIC | Age: 79
End: 2024-07-02
Payer: COMMERCIAL

## 2024-07-02 VITALS
HEART RATE: 51 BPM | HEIGHT: 62 IN | OXYGEN SATURATION: 99 % | DIASTOLIC BLOOD PRESSURE: 70 MMHG | BODY MASS INDEX: 32.76 KG/M2 | WEIGHT: 178 LBS | SYSTOLIC BLOOD PRESSURE: 120 MMHG

## 2024-07-02 DIAGNOSIS — E21.3 HYPERPARATHYROIDISM (HCC): ICD-10-CM

## 2024-07-02 DIAGNOSIS — M81.0 AGE-RELATED OSTEOPOROSIS WITHOUT CURRENT PATHOLOGICAL FRACTURE: ICD-10-CM

## 2024-07-02 PROCEDURE — 99204 OFFICE O/P NEW MOD 45 MIN: CPT | Performed by: STUDENT IN AN ORGANIZED HEALTH CARE EDUCATION/TRAINING PROGRAM

## 2024-07-03 ENCOUNTER — TELEPHONE (OUTPATIENT)
Age: 79
End: 2024-07-03

## 2024-07-03 NOTE — TELEPHONE ENCOUNTER
I believe this may have been just in reference to calcium and dairy products such as ice cream.  No restriction with regard to what type of ice cream from my perspective.

## 2024-07-03 NOTE — TELEPHONE ENCOUNTER
Patient called- her after visit summary states that she is able to eat vanilla ice cream. She doesn't really like vanilla, she likes chocolate or mint chip.     She wants to know if these are ok to eat? Please advise, she is upset about this.    1 person assist/verbal cues/nonverbal cues (demo/gestures)

## 2024-07-03 NOTE — TELEPHONE ENCOUNTER
Called to give patient PFT results, she asked about flavor ice cream she can have, advised her per Dr. Rolle, I believe this may have been just in reference to calcium and dairy products such as ice cream. No restriction with regard to what type of ice cream from my perspective.   No further questions.

## 2024-07-31 NOTE — PROGRESS NOTES
Ambulatory Visit  Name: Kenzie Cali      : 1945      MRN: [id number]  Encounter Provider: Lashonda Flores DO  Encounter Date: 2024   Encounter department: [facility] PRIMARY CARE    Assessment & Plan   1. Type 2 diabetes mellitus with stage 3a chronic kidney disease, without long-term current use of insulin ([facility])  Assessment & Plan:    Lab Results   Component Value Date    HGBA1C 5.7 2023   Diet controlled   Has rx to have repeat A1c  2. Age-related osteoporosis without current pathological fracture  Assessment & Plan:  Dexa scan 24 t-score lumbar spine -0.7; t-score total hip -1.9; t-score femoral neck -2.8  No fragility fracture  Workup for secondary causes showed elevated PTH at 101  Was referred to endocrinology who felt that this was secondary hyperparathyroidism   She was advised to start calcium supplement and repeat PTH in a month.   Endocrinology felt that it was okay to start prolia injections  Patient prefers to have them done here in PCP office due to location/proximity to her home.   Rx was sent to pharmacy in March     3. Hypothyroidism, unspecified type  Assessment & Plan:  Lab Results   Component Value Date    TSH 1.080 2024   Stable on synthroid 150 mcg daily  4. Pulmonary fibrosis ([facility])  Assessment & Plan:  Patient follows with pulmonology at [facility]  PFT's done 23 with 6 minute walk test and showed:    No obstructive airflow defect   Spirometry suggests restriction   No significant response to the administration of bronchodilator per ATS Standards  Normal Lung volumes     Decreased diffusion capacity.  This is not corrected for hemoglobin.     Flow volume loop is consistent with restriction     6MW results per above.  No supplemental oxygen required.  Patient only able to ambulate for 50 seconds.     5. Stage 3 chronic kidney disease, unspecified whether stage 3a or 3b CKD ([facility])  Assessment & Plan:  Lab Results   Component Value Date    EGFR  57 (L) 04/12/2024    EGFR 43 04/09/2024    EGFR 47 (L) 12/13/2023    CREATININE 1.01 (H) 04/12/2024    CREATININE 1.19 04/09/2024    CREATININE 1.18 (H) 12/13/2023   Stable.   Patient to avoid NSAIDs/nephrotoxins  6. Current moderate episode of major depressive disorder without prior episode (HCC)  Assessment & Plan:  PHQ9 score today is 9  She is on lexapro 20 mg daily    7. Low back pain potentially associated with radiculopathy  Assessment & Plan:  Referred to PT at time of her visit in December 2023  Xray of lumbar spine ordered and completed on 1/21/24. Showed:  Mild apex right scoliosis centered at L2.  Lumbar degenerative changes.  Aortic atherosclerotic disease    She saw pain management 5/28/24 and had MRI Of lumbar spine done on 6/20/24 and this showed  Degenerative changes of the lumbar spine,   She missed f/u appt with pain management.   8. Fatigue, unspecified type  -     CBC and differential         History of Present Illness     HPI  Patient is an 79 year old female with DM2, CKD, interstitial lung disease, hypothyroidism, history of DVT and PE and recent diagnosis of osteoporosis.     In workup for this, was found to have elevated PTH. Referred to endocrinology and saw Dr Rolle on 7/2/24 who felt that her hyperparathyroidism was secondary.   She was advised to increase calcium supplementation and repeat her PTH level in about 6-8 weeks. Endocrinology felt that there was no contraindication to her receiving prolia as treatment for her osteoporosis through our office.     Sleeping a lot during the day. Sleeps okay at night. Does not snore. Tired when she wakes up.      Weight is down 10 lbs in last month. Appetite is diminshed. Will fall asleep and just forget to eat dinner.    No nausea or vomiting. No abdominal pain. No diarrhea   Was getting mom's meals delivered but didn't like.         Review of Systems  Past Medical History:   Diagnosis Date   • Arthritis    • Cataract 03/2023   • Deep vein  "thrombosis (HCC)    • Diabetes mellitus (HCC)    • Diverticulitis    • Hypertension    • Hypothyroid    • Osteoarthritis    • Pneumonia    • Pulmonary fibrosis (HCC)      Past Surgical History:   Procedure Laterality Date   • CATARACT EXTRACTION     • CHOLECYSTECTOMY     • SKIN CANCER EXCISION       Family History   Problem Relation Age of Onset   • Breast cancer Mother    • Heart disease Father      Social History     Tobacco Use   • Smoking status: Never   • Smokeless tobacco: Never   Vaping Use   • Vaping status: Never Used   Substance and Sexual Activity   • Alcohol use: Not Currently   • Drug use: Never   • Sexual activity: Not Currently     Partners: Male     Current Outpatient Medications on File Prior to Visit   Medication Sig   • amLODIPine (NORVASC) 10 mg tablet Take 1 tablet (10 mg total) by mouth daily   • apixaban (Eliquis) 5 mg Take 5 mg by mouth 2 (two) times a day   • atorvastatin (LIPITOR) 20 mg tablet Take 20 mg by mouth daily   • escitalopram (LEXAPRO) 20 mg tablet Take 20 mg by mouth daily at bedtime   • levothyroxine (Synthroid) 150 mcg tablet Take 1 tablet (150 mcg total) by mouth daily in the early morning   • omeprazole (PriLOSEC) 20 mg delayed release capsule TAKE 1 CAPSULE (20 MG TOTAL) BY MOUTH AS NEEDED (AS NEEDED)   • denosumab (PROLIA) 60 mg/mL Inject 1 mL (60 mg total) under the skin once for 1 dose     No Known Allergies  Immunization History   Administered Date(s) Administered   • COVID-19 PFIZER VACCINE 0.3 ML IM 03/16/2021, 04/06/2021   • Influenza, high dose seasonal 0.7 mL 09/23/2021   • Pneumococcal Conjugate Vaccine 20-valent (Pcv20), Polysace 10/28/2022   • Pneumococcal Polysaccharide PPV23 09/01/2021   • Td (adult), adsorbed 07/18/2022     Objective     /54   Pulse 82   Temp 99.3 °F (37.4 °C)   Ht 5' 2\" (1.575 m)   Wt 76.2 kg (168 lb)   SpO2 97%   BMI 30.73 kg/m²     Physical Exam  Vitals and nursing note reviewed.   Constitutional:       General: She is not in " acute distress.     Appearance: Normal appearance. She is not ill-appearing, toxic-appearing or diaphoretic.      Comments: Ambulates with walker   HENT:      Head: Normocephalic and atraumatic.   Eyes:      Conjunctiva/sclera: Conjunctivae normal.   Cardiovascular:      Rate and Rhythm: Normal rate and regular rhythm.   Pulmonary:      Effort: Pulmonary effort is normal.      Breath sounds: Wheezing present.   Abdominal:      General: Abdomen is flat. Bowel sounds are normal.      Palpations: Abdomen is soft.   Musculoskeletal:      Cervical back: Normal range of motion.      Right lower leg: No edema.      Left lower leg: No edema.   Lymphadenopathy:      Cervical: No cervical adenopathy.   Skin:     General: Skin is warm and dry.      Findings: No rash.   Neurological:      General: No focal deficit present.      Mental Status: She is alert.   Psychiatric:      Comments: Affect flat       Depression Screening Follow-up Plan: Patient's depression screening was positive with a PHQ-2 score of . Their PHQ-9 score was 9. Patient assessed for underlying major depression. They have no active suicidal ideations. Brief counseling provided and recommend additional follow-up/re-evaluation next office visit.

## 2024-08-01 ENCOUNTER — OFFICE VISIT (OUTPATIENT)
Dept: FAMILY MEDICINE CLINIC | Facility: CLINIC | Age: 79
End: 2024-08-01
Payer: COMMERCIAL

## 2024-08-01 VITALS
SYSTOLIC BLOOD PRESSURE: 128 MMHG | TEMPERATURE: 99.3 F | HEIGHT: 62 IN | HEART RATE: 82 BPM | BODY MASS INDEX: 30.91 KG/M2 | DIASTOLIC BLOOD PRESSURE: 54 MMHG | OXYGEN SATURATION: 97 % | WEIGHT: 168 LBS

## 2024-08-01 DIAGNOSIS — R53.83 FATIGUE, UNSPECIFIED TYPE: ICD-10-CM

## 2024-08-01 DIAGNOSIS — R71.8 MICROCYTOSIS: ICD-10-CM

## 2024-08-01 DIAGNOSIS — J84.10 PULMONARY FIBROSIS (HCC): ICD-10-CM

## 2024-08-01 DIAGNOSIS — F32.1 CURRENT MODERATE EPISODE OF MAJOR DEPRESSIVE DISORDER WITHOUT PRIOR EPISODE (HCC): ICD-10-CM

## 2024-08-01 DIAGNOSIS — N18.30 STAGE 3 CHRONIC KIDNEY DISEASE, UNSPECIFIED WHETHER STAGE 3A OR 3B CKD (HCC): ICD-10-CM

## 2024-08-01 DIAGNOSIS — M54.50 LOW BACK PAIN POTENTIALLY ASSOCIATED WITH RADICULOPATHY: ICD-10-CM

## 2024-08-01 DIAGNOSIS — E11.22 TYPE 2 DIABETES MELLITUS WITH STAGE 3A CHRONIC KIDNEY DISEASE, WITHOUT LONG-TERM CURRENT USE OF INSULIN (HCC): Primary | ICD-10-CM

## 2024-08-01 DIAGNOSIS — M81.0 AGE-RELATED OSTEOPOROSIS WITHOUT CURRENT PATHOLOGICAL FRACTURE: ICD-10-CM

## 2024-08-01 DIAGNOSIS — E03.9 HYPOTHYROIDISM, UNSPECIFIED TYPE: ICD-10-CM

## 2024-08-01 DIAGNOSIS — N18.31 TYPE 2 DIABETES MELLITUS WITH STAGE 3A CHRONIC KIDNEY DISEASE, WITHOUT LONG-TERM CURRENT USE OF INSULIN (HCC): Primary | ICD-10-CM

## 2024-08-01 PROCEDURE — G2211 COMPLEX E/M VISIT ADD ON: HCPCS | Performed by: FAMILY MEDICINE

## 2024-08-01 PROCEDURE — 99214 OFFICE O/P EST MOD 30 MIN: CPT | Performed by: FAMILY MEDICINE

## 2024-08-01 PROCEDURE — 3288F FALL RISK ASSESSMENT DOCD: CPT | Performed by: FAMILY MEDICINE

## 2024-08-01 PROCEDURE — 1101F PT FALLS ASSESS-DOCD LE1/YR: CPT | Performed by: FAMILY MEDICINE

## 2024-08-01 PROCEDURE — 1160F RVW MEDS BY RX/DR IN RCRD: CPT | Performed by: FAMILY MEDICINE

## 2024-08-01 PROCEDURE — 1159F MED LIST DOCD IN RCRD: CPT | Performed by: FAMILY MEDICINE

## 2024-08-01 PROCEDURE — 3725F SCREEN DEPRESSION PERFORMED: CPT | Performed by: FAMILY MEDICINE

## 2024-08-01 NOTE — ASSESSMENT & PLAN NOTE
Lab Results   Component Value Date    HGBA1C 5.7 12/06/2023   Diet controlled   Has rx to have repeat A1c

## 2024-08-01 NOTE — PATIENT INSTRUCTIONS
Please get labs done as ordered  Once I have your calcium level result back, we can schedule a nurse visit for you to get your prolia  Make sure to print visit summary so that she can compare to medications at home.

## 2024-08-01 NOTE — ASSESSMENT & PLAN NOTE
Lab Results   Component Value Date    EGFR 57 (L) 04/12/2024    EGFR 43 04/09/2024    EGFR 47 (L) 12/13/2023    CREATININE 1.01 (H) 04/12/2024    CREATININE 1.19 04/09/2024    CREATININE 1.18 (H) 12/13/2023   Stable.   Patient to avoid NSAIDs/nephrotoxins

## 2024-08-01 NOTE — ASSESSMENT & PLAN NOTE
Referred to PT at time of her visit in December 2023  Xray of lumbar spine ordered and completed on 1/21/24. Showed:  Mild apex right scoliosis centered at L2.  Lumbar degenerative changes.  Aortic atherosclerotic disease    She saw pain management 5/28/24 and had MRI Of lumbar spine done on 6/20/24 and this showed  Degenerative changes of the lumbar spine,   She missed f/u appt with pain management.

## 2024-08-01 NOTE — ASSESSMENT & PLAN NOTE
Dexa scan 1/17/24 t-score lumbar spine -0.7; t-score total hip -1.9; t-score femoral neck -2.8  No fragility fracture  Workup for secondary causes showed elevated PTH at 101  Was referred to endocrinology who felt that this was secondary hyperparathyroidism   She was advised to start calcium supplement and repeat PTH in a month.   Endocrinology felt that it was okay to start prolia injections  Patient prefers to have them done here in PCP office due to location/proximity to her home.   Rx was sent to pharmacy in March

## 2024-08-01 NOTE — ASSESSMENT & PLAN NOTE
Patient follows with pulmonology at [facility]  PFT's done 12/13/23 with 6 minute walk test and showed:  No obstructive airflow defect      Spirometry suggests restriction      No significant response to the administration of bronchodilator per ATS Standards     Normal Lung volumes     Decreased diffusion capacity.  This is not corrected for hemoglobin.     Flow volume loop is consistent with restriction     6MW results per above.  No supplemental oxygen required.  Patient only able to ambulate for 50 seconds.

## 2024-08-02 ENCOUNTER — TELEPHONE (OUTPATIENT)
Dept: FAMILY MEDICINE CLINIC | Facility: CLINIC | Age: 79
End: 2024-08-02

## 2024-08-02 LAB
BASOPHILS # BLD AUTO: 0 X10E3/UL (ref 0–0.2)
BASOPHILS NFR BLD AUTO: 0 %
EOSINOPHIL # BLD AUTO: 0.2 X10E3/UL (ref 0–0.4)
EOSINOPHIL NFR BLD AUTO: 2 %
ERYTHROCYTE [DISTWIDTH] IN BLOOD BY AUTOMATED COUNT: 13.8 % (ref 11.7–15.4)
HCT VFR BLD AUTO: 38.4 % (ref 34–46.6)
HGB BLD-MCNC: 11.8 G/DL (ref 11.1–15.9)
IMM GRANULOCYTES # BLD: 0 X10E3/UL (ref 0–0.1)
IMM GRANULOCYTES NFR BLD: 0 %
LYMPHOCYTES # BLD AUTO: 2 X10E3/UL (ref 0.7–3.1)
LYMPHOCYTES NFR BLD AUTO: 23 %
MCH RBC QN AUTO: 26.5 PG (ref 26.6–33)
MCHC RBC AUTO-ENTMCNC: 30.7 G/DL (ref 31.5–35.7)
MCV RBC AUTO: 86 FL (ref 79–97)
MONOCYTES # BLD AUTO: 1.2 X10E3/UL (ref 0.1–0.9)
MONOCYTES NFR BLD AUTO: 13 %
NEUTROPHILS # BLD AUTO: 5.3 X10E3/UL (ref 1.4–7)
NEUTROPHILS NFR BLD AUTO: 62 %
PLATELET # BLD AUTO: 209 X10E3/UL (ref 150–450)
RBC # BLD AUTO: 4.46 X10E6/UL (ref 3.77–5.28)
WBC # BLD AUTO: 8.7 X10E3/UL (ref 3.4–10.8)

## 2024-08-02 NOTE — TELEPHONE ENCOUNTER
[facility] level check per Dr. Flores. He was able to add it on, confirmed fax number.       Attempted to call patient but phone kept ringing could not reach .

## 2024-08-02 NOTE — TELEPHONE ENCOUNTER
----- Message from Lashonda Flores DO sent at 8/1/2024  4:51 PM EDT -----  She first needs to have cmp to check her calcium level. Scheduled nurse visit to get this  Remind MA to remove from fridge and bring to room temp prior to administration.   Also check expiration date prior to administering.  ----- Message -----  From: Daysi Fontenot MA  Sent: 8/1/2024   4:19 PM EDT  To: Lashonda Flores DO; Soraida Flores MA    Prolia is in fridge, is patient ok to receive?  ----- Message -----  From: Soraida Flores MA  Sent: 8/1/2024   2:53 PM EDT  To: Daysi Fontenot MA      ----- Message -----  From: Jimenez Couch  Sent: 8/1/2024   2:39 PM EDT  To: [facility]; #    Good afternoon,  I see this patient is scheduled for a prolia injection. I am trying to figure out if this requires an auth. It looks like Daysi Fontenot submitted for auth & the med was received. Can someone help me determine if this needs a new auth. Will this be buy & bill? Hard to try putting all the pieces together.

## 2024-08-02 NOTE — TELEPHONE ENCOUNTER
Received paperwork regarding verbal order given this morning. They are needing additional information. Will place in your bin to sign.

## 2024-08-02 NOTE — TELEPHONE ENCOUNTER
----- Message from Lashonda Flores DO sent at 8/2/2024  7:41 AM EDT -----  Cbc showed no anemia.   Let patient know.   Awaiting the results of remainder of her labs.   Can you call lab to see if they are able to add iron level to her current bloodwork. I put order in.

## 2024-08-21 ENCOUNTER — TELEPHONE (OUTPATIENT)
Dept: FAMILY MEDICINE CLINIC | Facility: CLINIC | Age: 79
End: 2024-08-21

## 2024-08-21 LAB — IRON SERPL-MCNC: 61 UG/DL (ref 27–139)

## 2024-08-21 NOTE — TELEPHONE ENCOUNTER
Spoke with patient and advised of result.    Also advised that CMP had not been drawn but that we were hopefully able to add those tests on to the sample she gave yesterday.    We will await the result from [facility] and can then see about moving forward with her Prolia injection.

## 2024-08-21 NOTE — TELEPHONE ENCOUNTER
----- Message from Lashonda Flores DO sent at 8/21/2024  8:08 AM EDT -----  Let patient know that her cbc showed no anemia and her iron level was normal   The CMP was not done. Need this for her to receive prolia shot. Order is on chart but for some reason, not completed......  Call lab to find out why they did not do CMP, lipid, A1c, TSH when they did the cbc

## 2024-09-12 ENCOUNTER — OFFICE VISIT (OUTPATIENT)
Dept: FAMILY MEDICINE CLINIC | Facility: CLINIC | Age: 79
End: 2024-09-12
Payer: COMMERCIAL

## 2024-09-12 VITALS
WEIGHT: 170.6 LBS | SYSTOLIC BLOOD PRESSURE: 142 MMHG | HEIGHT: 62 IN | OXYGEN SATURATION: 92 % | BODY MASS INDEX: 31.39 KG/M2 | DIASTOLIC BLOOD PRESSURE: 65 MMHG | HEART RATE: 86 BPM

## 2024-09-12 DIAGNOSIS — R32 URINARY INCONTINENCE, UNSPECIFIED TYPE: ICD-10-CM

## 2024-09-12 DIAGNOSIS — E11.22 TYPE 2 DIABETES MELLITUS WITH STAGE 3A CHRONIC KIDNEY DISEASE, WITHOUT LONG-TERM CURRENT USE OF INSULIN (HCC): ICD-10-CM

## 2024-09-12 DIAGNOSIS — E21.3 HYPERPARATHYROIDISM (HCC): ICD-10-CM

## 2024-09-12 DIAGNOSIS — N18.31 TYPE 2 DIABETES MELLITUS WITH STAGE 3A CHRONIC KIDNEY DISEASE, WITHOUT LONG-TERM CURRENT USE OF INSULIN (HCC): ICD-10-CM

## 2024-09-12 DIAGNOSIS — E03.9 HYPOTHYROIDISM, UNSPECIFIED TYPE: ICD-10-CM

## 2024-09-12 DIAGNOSIS — R74.01 TRANSAMINASEMIA: ICD-10-CM

## 2024-09-12 DIAGNOSIS — M81.0 AGE-RELATED OSTEOPOROSIS WITHOUT CURRENT PATHOLOGICAL FRACTURE: Primary | ICD-10-CM

## 2024-09-12 DIAGNOSIS — B37.2 CANDIDAL INTERTRIGO: ICD-10-CM

## 2024-09-12 DIAGNOSIS — F32.1 CURRENT MODERATE EPISODE OF MAJOR DEPRESSIVE DISORDER WITHOUT PRIOR EPISODE (HCC): ICD-10-CM

## 2024-09-12 LAB
ALBUMIN SERPL-MCNC: 4 G/DL (ref 3.8–4.8)
ALP SERPL-CCNC: 72 IU/L (ref 44–121)
ALT SERPL-CCNC: 45 IU/L (ref 0–32)
AST SERPL-CCNC: 74 IU/L (ref 0–40)
BILIRUB SERPL-MCNC: 0.4 MG/DL (ref 0–1.2)
BUN SERPL-MCNC: 21 MG/DL (ref 8–27)
BUN/CREAT SERPL: 17 (ref 12–28)
CALCIUM SERPL-MCNC: 10.1 MG/DL (ref 8.7–10.3)
CHLORIDE SERPL-SCNC: 106 MMOL/L (ref 96–106)
CHOLEST SERPL-MCNC: 146 MG/DL (ref 100–199)
CO2 SERPL-SCNC: 15 MMOL/L (ref 20–29)
CREAT SERPL-MCNC: 1.21 MG/DL (ref 0.57–1)
EGFR: 45 ML/MIN/1.73
GLOBULIN SER-MCNC: 3.3 G/DL (ref 1.5–4.5)
GLUCOSE SERPL-MCNC: 141 MG/DL (ref 70–99)
HDLC SERPL-MCNC: 29 MG/DL
LDLC SERPL CALC-MCNC: 92 MG/DL (ref 0–99)
POTASSIUM SERPL-SCNC: 4.1 MMOL/L (ref 3.5–5.2)
PROT SERPL-MCNC: 7.3 G/DL (ref 6–8.5)
SL AMB POCT HEMOGLOBIN AIC: 5.7 (ref ?–6.5)
SL AMB VLDL CHOLESTEROL CALC: 25 MG/DL (ref 5–40)
SODIUM SERPL-SCNC: 141 MMOL/L (ref 134–144)
TRIGL SERPL-MCNC: 140 MG/DL (ref 0–149)
TSH SERPL DL<=0.005 MIU/L-ACNC: 0.21 UIU/ML (ref 0.45–4.5)

## 2024-09-12 PROCEDURE — 83036 HEMOGLOBIN GLYCOSYLATED A1C: CPT | Performed by: FAMILY MEDICINE

## 2024-09-12 PROCEDURE — 99214 OFFICE O/P EST MOD 30 MIN: CPT | Performed by: FAMILY MEDICINE

## 2024-09-12 RX ORDER — LEVOTHYROXINE SODIUM 137 UG/1
137 TABLET ORAL
Qty: 100 TABLET | Refills: 3 | Status: SHIPPED | OUTPATIENT
Start: 2024-09-12

## 2024-09-12 RX ORDER — NYSTATIN 100000 U/G
CREAM TOPICAL 2 TIMES DAILY
Qty: 30 G | Refills: 1 | Status: SHIPPED | OUTPATIENT
Start: 2024-09-12

## 2024-09-12 NOTE — ASSESSMENT & PLAN NOTE
"Saw endocrinology in consultation \"Recommend starting calcium supplementation and repeating labs including PTH level in about 2 months. Will follow-up with patient as results are available. Suspect most likely this is secondary hyperparathyroidism, but if calcium were to increase and PTH remains elevated, this can point more toward primary hyperparathyroidism. Will follow-up on labs as they are available. No contraindication from endocrinology perspective to start Prolia ordered through primary as primary care physician's office \"   she is taking calcium supplement  She has not gone for repeat PTH and reminded her to do so.        "

## 2024-09-12 NOTE — ASSESSMENT & PLAN NOTE
Lab Results   Component Value Date    TSH 0.215 (L) 08/20/2024     Reviewed labs  TSH suppressed  On levothyroxine 150 mcg daily  Decrease this to 137 mcg daily and repeat labs in 6-8 weeks.     Orders:    levothyroxine 137 mcg tablet; Take 1 tablet (137 mcg total) by mouth daily in the early morning    TSH, 3rd generation with Free T4 reflex; Future    TSH, 3rd generation with Free T4 reflex

## 2024-09-12 NOTE — ASSESSMENT & PLAN NOTE
Dexa scan 1/17/24 t-score lumbar spine -0.7; t-score total hip -1.9; t-score femoral neck -2.8   Lab Results   Component Value Date    SODIUM 141 08/20/2024    K 4.1 08/20/2024     08/20/2024    CO2 15 (L) 08/20/2024    AGAP 10 04/09/2024    BUN 21 08/20/2024    CREATININE 1.21 (H) 08/20/2024    GLUC 141 (H) 08/20/2024    CALCIUM 10.2 04/09/2024    AST 74 (H) 08/20/2024    ALT 45 (H) 08/20/2024    ALKPHOS 67 04/09/2024    TP 7.3 08/20/2024    TBILI 0.4 08/20/2024    EGFR 45 (L) 08/20/2024     Calcium is normal.   Prolia administered today  Return in 6 months for next injection.

## 2024-09-12 NOTE — ASSESSMENT & PLAN NOTE
Lab Results   Component Value Date    HGBA1C 5.7 12/06/2023   Diet controlled  Reviewed labs.

## 2024-09-12 NOTE — PROGRESS NOTES
"Ambulatory Visit  Name: Kenzie Cali      : 1945      MRN: [id number]  Encounter Provider: Lashonda Flores DO  Encounter Date: 2024   Encounter department: [facility] PRIMARY CARE    Assessment & Plan  Age-related osteoporosis without current pathological fracture  Dexa scan 24 t-score lumbar spine -0.7; t-score total hip -1.9; t-score femoral neck -2.8   Lab Results   Component Value Date    SODIUM 141 2024    K 4.1 2024     2024    CO2 15 (L) 2024    AGAP 10 2024    BUN 21 2024    CREATININE 1.21 (H) 2024    GLUC 141 (H) 2024    CALCIUM 10.2 2024    AST 74 (H) 2024    ALT 45 (H) 2024    ALKPHOS 67 2024    TP 7.3 2024    TBILI 0.4 2024    EGFR 45 (L) 2024     Calcium is normal.   Prolia administered today  Return in 6 months for next injection.          Hyperparathyroidism (HCC)  Saw endocrinology in consultation \"Recommend starting calcium supplementation and repeating labs including PTH level in about 2 months. Will follow-up with patient as results are available. Suspect most likely this is secondary hyperparathyroidism, but if calcium were to increase and PTH remains elevated, this can point more toward primary hyperparathyroidism. Will follow-up on labs as they are available. No contraindication from endocrinology perspective to start Prolia ordered through primary as primary care physician's office \"   she is taking calcium supplement  She has not gone for repeat PTH and reminded her to do so.        Transaminasemia  AST and ALT remain elevated but stable.   Has US of abdomen in January which showed fatty liver.   Recommend diet/exercise/weight loss         Hypothyroidism, unspecified type  Lab Results   Component Value Date    TSH 0.215 (L) 2024     Reviewed labs  TSH suppressed  On levothyroxine 150 mcg daily  Decrease this to 137 mcg daily and repeat labs in 6-8 weeks. "     Orders:  •  levothyroxine 137 mcg tablet; Take 1 tablet (137 mcg total) by mouth daily in the early morning  •  TSH, 3rd generation with Free T4 reflex; Future  •  TSH, 3rd generation with Free T4 reflex    Type 2 diabetes mellitus with stage 3a chronic kidney disease, without long-term current use of insulin (HCC)    Lab Results   Component Value Date    HGBA1C 5.7 12/06/2023   Diet controlled  Reviewed labs.          Urinary incontinence, unspecified type         Current moderate episode of major depressive disorder without prior episode (HCC)  Patient states that she is no longer taking her lexapro  Feels that moods are good.          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   Also  for  review of her labs that were completed on 8/20/24. For some reason unknown to me, I just received the results of her labs for review today.     Has urinary incontinence. Wears depends and states that she generally urinates about 3 times before she changes her depends.   [place] [employer] [place] afterwards.   Worried she might have bed sores because she has been having pain when she sits down.       Has caretaker that comes in to help her daily.   She has no idea what medications she is taking today. Asked her to bring all meds in a bag to next visit.         Review of Systems  Past Medical History:   Diagnosis Date   • Arthritis    • Cataract 03/2023   • Deep vein thrombosis (HCC)    • Diabetes mellitus  "(HCC)    • Diverticulitis    • Hypertension    • Hypothyroid    • Osteoarthritis    • Pneumonia    • Pulmonary fibrosis (HCC)      Past Surgical History:   Procedure Laterality Date   • CATARACT EXTRACTION     • CHOLECYSTECTOMY     • SKIN CANCER EXCISION       Family History   Problem Relation Age of Onset   • Breast cancer Mother    • Heart disease Father      Social History     Tobacco Use   • Smoking status: Never   • Smokeless tobacco: Never   Vaping Use   • Vaping status: Never Used   Substance and Sexual Activity   • Alcohol use: Not Currently   • Drug use: Never   • Sexual activity: Not Currently     Partners: Male     Current Outpatient Medications on File Prior to Visit   Medication Sig   • amLODIPine (NORVASC) 10 mg tablet Take 1 tablet (10 mg total) by mouth daily   • apixaban (Eliquis) 5 mg Take 5 mg by mouth 2 (two) times a day   • atorvastatin (LIPITOR) 20 mg tablet Take 20 mg by mouth daily   • omeprazole (PriLOSEC) 20 mg delayed release capsule TAKE 1 CAPSULE (20 MG TOTAL) BY MOUTH AS NEEDED (AS NEEDED)   • [DISCONTINUED] levothyroxine (Synthroid) 150 mcg tablet Take 1 tablet (150 mcg total) by mouth daily in the early morning   • denosumab (PROLIA) 60 mg/mL Inject 1 mL (60 mg total) under the skin once for 1 dose   • [DISCONTINUED] escitalopram (LEXAPRO) 20 mg tablet Take 20 mg by mouth daily at bedtime (Patient not taking: Reported on 9/12/2024)     No Known Allergies  Immunization History   Administered Date(s) Administered   • COVID-19 PFIZER VACCINE 0.3 ML IM 03/16/2021, 04/06/2021   • Influenza, high dose seasonal 0.7 mL 09/23/2021   • Pneumococcal Conjugate Vaccine 20-valent (Pcv20), Polysace 10/28/2022   • Pneumococcal Polysaccharide PPV23 09/01/2021   • Td (adult), adsorbed 07/18/2022     Objective     /65   Pulse 86   Ht 5' 2\" (1.575 m)   Wt 77.4 kg (170 lb 9.6 oz)   SpO2 92%   BMI 31.20 kg/m²     Physical Exam  Vitals and nursing note reviewed.   Constitutional:       General: " She is not in acute distress.     Appearance: Normal appearance. She is not ill-appearing, toxic-appearing or diaphoretic.   Eyes:      Conjunctiva/sclera: Conjunctivae normal.   Neck:      Vascular: No carotid bruit.   Cardiovascular:      Rate and Rhythm: Normal rate and regular rhythm.      Heart sounds: No murmur heard.  Pulmonary:      Effort: Pulmonary effort is normal.      Breath sounds: Normal breath sounds.   Musculoskeletal:      Cervical back: Normal range of motion.   Skin:     Comments: Skin of perineum and groin erythematous. Some erythema buttocks in perianal region. No open wound   Neurological:      General: No focal deficit present.      Mental Status: She is alert.   Psychiatric:         Mood and Affect: Mood normal.

## 2024-09-12 NOTE — ASSESSMENT & PLAN NOTE
AST and ALT remain elevated but stable.   Has US of abdomen in January which showed fatty liver.   Recommend diet/exercise/weight loss

## 2024-09-12 NOTE — PATIENT INSTRUCTIONS
Please review your visit summary when you get home to make sure it coincides with the mediations you have at home.     BRING ALL MEDICATIONS ALONG WITH YOU IN A BAG TO YOUR OFFICE VISITS.     I decreased your levothyroxine dose from 150 mcg daily to 137 mcg daily    Repeat labs in about 6 weeks. I will call with results.     Keep skin of perineal area clean and dry. You can use hairdryer on cool setting to dry skin after bathing.   Change adult diaper more frequently.   Apply nystatin cream twice daily.   When healed up use a barrier cream such as desitin or triple paste.

## 2024-09-23 ENCOUNTER — TELEPHONE (OUTPATIENT)
Dept: OTHER | Facility: OTHER | Age: 79
End: 2024-09-23

## 2024-09-23 ENCOUNTER — TELEPHONE (OUTPATIENT)
Age: 79
End: 2024-09-23

## 2024-09-23 NOTE — TELEPHONE ENCOUNTER
Called patient back to get more information on what medication she was looking to be put on, she would like to be put on motrin. States she got 5-6 motrin 800mg from the dentist about 1 year ago for a tooth pull, and the other day she was cleaning things out and she took one and it helped her back pain. Patient states she could walk a little better, asking for a script.      [facility] [phone]

## 2024-09-23 NOTE — TELEPHONE ENCOUNTER
Patient called asking if she can speak with Dr Flores regarding a prescription she wants to be on . Please advise and contact pt

## 2024-09-24 NOTE — TELEPHONE ENCOUNTER
Patient called the RX Refill Line. Message is being forwarded to the office.     Patient is requesting to speak with office staff, warm transfer attempted, stated wait time was 18 minutes. Patient added to call back list. Patient was informed the office will contact her back,she verbalized understanding.    Please contact patient at [phone].

## 2024-09-24 NOTE — TELEPHONE ENCOUNTER
"Called patient and states that its the only thing that helps her, patient reports that maybe she will stop the blood thinner, I advised patient that she cannot stop the eliquis, patient reports \" I'm going to die some how, I rather die without pain\".   Patient verbalized understanding, states what about tylenol-per Manasa patient should hold on tylenol and follow up with pain dr as she has stated that she seen before and patient verbalized understanding.     "

## 2024-09-30 ENCOUNTER — OFFICE VISIT (OUTPATIENT)
Dept: PAIN MEDICINE | Facility: CLINIC | Age: 79
End: 2024-09-30
Payer: COMMERCIAL

## 2024-09-30 VITALS
BODY MASS INDEX: 31.28 KG/M2 | DIASTOLIC BLOOD PRESSURE: 68 MMHG | HEIGHT: 62 IN | TEMPERATURE: 98.9 F | WEIGHT: 170 LBS | SYSTOLIC BLOOD PRESSURE: 130 MMHG | HEART RATE: 81 BPM

## 2024-09-30 DIAGNOSIS — M47.816 LUMBAR SPONDYLOSIS: Primary | ICD-10-CM

## 2024-09-30 DIAGNOSIS — G89.29 CHRONIC BILATERAL LOW BACK PAIN WITHOUT SCIATICA: ICD-10-CM

## 2024-09-30 DIAGNOSIS — M54.50 CHRONIC BILATERAL LOW BACK PAIN WITHOUT SCIATICA: ICD-10-CM

## 2024-09-30 PROCEDURE — G2211 COMPLEX E/M VISIT ADD ON: HCPCS | Performed by: PHYSICIAN ASSISTANT

## 2024-09-30 PROCEDURE — 99214 OFFICE O/P EST MOD 30 MIN: CPT | Performed by: PHYSICIAN ASSISTANT

## 2024-09-30 RX ORDER — GABAPENTIN 100 MG/1
100 CAPSULE ORAL 3 TIMES DAILY
Qty: 90 CAPSULE | Refills: 2 | Status: SHIPPED | OUTPATIENT
Start: 2024-09-30

## 2024-09-30 NOTE — PROGRESS NOTES
Assessment:  1. Lumbar spondylosis    2. Chronic bilateral low back pain without sciatica        Plan:  While the patient was in the office today, I did have a thorough conversation regarding their chronic pain syndrome, medication management, and treatment plan options.    At this point in time I have recommended the initiation of gabapentin.  Will start 100 mg daily and gradually titrate to 3 times daily if tolerated.  Patient was educated on medications most common side effects which include dizziness, drowsiness, and swelling of the hands and feet.  Patient was instructed to call the office with any adverse medication side effects. The patient is also aware that if they would like to come off of the medication, they need to let us know as suddenly stopping the medication puts them at risk to have a seizure.   If patient fails to respond to gabapentin, consider duloxetine.    Patient is aware not to take over-the-counter NSAIDs due to renal insufficiency and also to avoid over-the-counter acetaminophen due to elevated liver enzymes.  She was previously also advised by her PCP.    Physical therapy was ordered and recommended however the patient declines.    Consider gentle chiropractic therapy.    The patient will follow-up in 12 weeks for medication prescription refill and reevaluation. The patient was advised to contact the office should their symptoms worsen in the interim. The patient was agreeable and verbalized an understanding.        History of Present Illness:    The patient is a 79 y.o. female last seen on 5/28/2024 who presents for a follow up office visit in regards to chronic low back pain secondary to lumbar spondylosis.  The patient currently reports low back pain that she rates 10 out of 10 today and describes it as a constant sharp pain.  Patient has no lower extremity radicular symptoms.  Her back pain is made worse with standing and walking.  She has near complete resolution of pain with laying  down.  Physical therapy was ordered on her last appointment however the patient does not have an interest in this.  Since her last visit with us she underwent a lumbar spine MRI and presents today to review it.  She was told by her PCP that she should not be taking over-the-counter pain medications due to renal insufficiency and elevated liver enzymes.    I have personally reviewed and/or updated the patient's past medical history, past surgical history, family history, social history, current medications, allergies, and vital signs today.       Review of Systems:    Review of Systems   Respiratory:  Positive for shortness of breath.    Cardiovascular:  Negative for chest pain.   Gastrointestinal:  Negative for constipation, diarrhea, nausea and vomiting.   Musculoskeletal:  Positive for gait problem. Negative for arthralgias, joint swelling (Joint stiffness) and myalgias.   Skin:  Negative for rash.   Neurological:  Negative for dizziness, seizures and weakness.   All other systems reviewed and are negative.        Past Medical History:   Diagnosis Date    Arthritis     Cataract 03/2023    Deep vein thrombosis (HCC)     Diabetes mellitus (HCC)     Diverticulitis     Hypertension     Hypothyroid     Osteoarthritis     Pneumonia     Pulmonary fibrosis (HCC)        Past Surgical History:   Procedure Laterality Date    CATARACT EXTRACTION      CHOLECYSTECTOMY      SKIN CANCER EXCISION         Family History   Problem Relation Age of Onset    Breast cancer Mother     Heart disease Father        Social History     Occupational History    Not on file   Tobacco Use    Smoking status: Never    Smokeless tobacco: Never   Vaping Use    Vaping status: Never Used   Substance and Sexual Activity    Alcohol use: Not Currently    Drug use: Never    Sexual activity: Not Currently     Partners: Male         Current Outpatient Medications:     amLODIPine (NORVASC) 10 mg tablet, Take 1 tablet (10 mg total) by mouth daily, Disp: 90  "tablet, Rfl: 1    apixaban (Eliquis) 5 mg, Take 5 mg by mouth 2 (two) times a day, Disp: , Rfl:     atorvastatin (LIPITOR) 20 mg tablet, Take 20 mg by mouth daily, Disp: , Rfl:     gabapentin (NEURONTIN) 100 mg capsule, Take 1 capsule (100 mg total) by mouth 3 (three) times a day Take 1 tabs QD x 3 days, then 1 tab BID x 3 days, then increase to TID, Disp: 90 capsule, Rfl: 2    levothyroxine 137 mcg tablet, Take 1 tablet (137 mcg total) by mouth daily in the early morning, Disp: 100 tablet, Rfl: 3    nystatin (MYCOSTATIN) cream, Apply topically 2 (two) times a day, Disp: 30 g, Rfl: 1    omeprazole (PriLOSEC) 20 mg delayed release capsule, TAKE 1 CAPSULE (20 MG TOTAL) BY MOUTH AS NEEDED (AS NEEDED), Disp: 90 capsule, Rfl: 1    denosumab (PROLIA) 60 mg/mL, Inject 1 mL (60 mg total) under the skin once for 1 dose, Disp: 1 mL, Rfl: 0    No Known Allergies    Physical Exam:    /68 (BP Location: Left arm, Patient Position: Sitting, Cuff Size: Standard)   Pulse 81   Temp 98.9 °F (37.2 °C)   Ht 5' 2\" (1.575 m)   Wt 77.1 kg (170 lb)   BMI 31.09 kg/m²     Constitutional:normal, well developed, well nourished, alert, in no distress and non-toxic and no overt pain behavior.  Eyes:anicteric  HEENT:grossly intact  Neck:supple, symmetric, trachea midline and no masses   Pulmonary:even and unlabored  Cardiovascular:No edema or pitting edema present  Skin:Normal without rashes or lesions and well hydrated  Psychiatric:Mood and affect appropriate  Neurologic:Cranial Nerves II-XII grossly intact  Musculoskeletal: Patient ambulates with walker, tender bilateral lumbar facet joint      Imaging  No orders to display         No orders of the defined types were placed in this encounter.      "

## 2024-10-25 ENCOUNTER — TELEPHONE (OUTPATIENT)
Age: 79
End: 2024-10-25

## 2024-10-25 NOTE — TELEPHONE ENCOUNTER
Pt states the gabapentin (NEURONTIN) 100 mg capsule is not working at all for her pain. Please advise patient at [phone]

## 2024-10-28 ENCOUNTER — TELEPHONE (OUTPATIENT)
Age: 79
End: 2024-10-28

## 2024-10-28 NOTE — TELEPHONE ENCOUNTER
Caller: akila Espino    Doctor: Nanci    Reason for call: pt called stating the gabapentin isn't working and is asking if it can be increased?    Call back#: [phone]     [facility]/pharmacy #[id number] - [place] - [address] [phone]

## 2024-10-28 NOTE — TELEPHONE ENCOUNTER
S/w pt, confirmed gabapentin 100 mg tid x a couple weeks with no relief, no se's. Pt questioned increasing the medication. Advised pt, the writer will d/w MG and this office will cb to advise. Pt verbalized understanding and appreciation.

## 2024-10-28 NOTE — TELEPHONE ENCOUNTER
This med was prescribed by pain management when she saw them on 9/30  \recommend she f/u with their office as scheduled to discuss her pain

## 2024-10-29 NOTE — TELEPHONE ENCOUNTER
S/W pt.  Pt agreed to trying Gabapentin 200mg TID.  Nurse educated pt in titration increase.  For three days:  I00mg in AM  100mg Noon  200 mg Before Bed  For Three Days:  200 mg AM  100mg Noon  200 mg Before Bed  Then 200 mg TID  Nurse explained side effects and to call if pt has side effects to medication increase.  Pt agreeable and verbalized understanding.      Pt has enough medication at this time and will call when needs refill.

## 2024-10-30 ENCOUNTER — NURSE TRIAGE (OUTPATIENT)
Age: 79
End: 2024-10-30

## 2024-10-30 ENCOUNTER — APPOINTMENT (EMERGENCY)
Dept: CT IMAGING | Facility: HOSPITAL | Age: 79
End: 2024-10-30
Payer: COMMERCIAL

## 2024-10-30 ENCOUNTER — HOSPITAL ENCOUNTER (EMERGENCY)
Facility: HOSPITAL | Age: 79
Discharge: HOME | End: 2024-10-30
Attending: EMERGENCY MEDICINE | Admitting: EMERGENCY MEDICINE
Payer: COMMERCIAL

## 2024-10-30 VITALS
DIASTOLIC BLOOD PRESSURE: 75 MMHG | RESPIRATION RATE: 16 BRPM | WEIGHT: 170 LBS | HEART RATE: 90 BPM | SYSTOLIC BLOOD PRESSURE: 175 MMHG | OXYGEN SATURATION: 94 % | TEMPERATURE: 98.2 F | BODY MASS INDEX: 31.09 KG/M2

## 2024-10-30 DIAGNOSIS — R93.5 ABNORMAL CT OF THE ABDOMEN: ICD-10-CM

## 2024-10-30 DIAGNOSIS — K74.60 CIRRHOSIS (HCC): ICD-10-CM

## 2024-10-30 DIAGNOSIS — R19.7 DIARRHEA: Primary | ICD-10-CM

## 2024-10-30 DIAGNOSIS — K60.2 ANAL FISSURE: ICD-10-CM

## 2024-10-30 LAB
ALBUMIN SERPL BCG-MCNC: 4.1 G/DL (ref 3.5–5)
ALP SERPL-CCNC: 56 U/L (ref 34–104)
ALT SERPL W P-5'-P-CCNC: 37 U/L (ref 7–52)
ANION GAP SERPL CALCULATED.3IONS-SCNC: 8 MMOL/L (ref 4–13)
AST SERPL W P-5'-P-CCNC: 63 U/L (ref 13–39)
BASOPHILS # BLD AUTO: 0.05 THOUSANDS/ΜL (ref 0–0.1)
BASOPHILS NFR BLD AUTO: 1 % (ref 0–1)
BILIRUB SERPL-MCNC: 0.63 MG/DL (ref 0.2–1)
BUN SERPL-MCNC: 21 MG/DL (ref 5–25)
CALCIUM SERPL-MCNC: 9.3 MG/DL (ref 8.4–10.2)
CHLORIDE SERPL-SCNC: 113 MMOL/L (ref 96–108)
CO2 SERPL-SCNC: 20 MMOL/L (ref 21–32)
CREAT SERPL-MCNC: 1.07 MG/DL (ref 0.6–1.3)
EOSINOPHIL # BLD AUTO: 0.13 THOUSAND/ΜL (ref 0–0.61)
EOSINOPHIL NFR BLD AUTO: 2 % (ref 0–6)
ERYTHROCYTE [DISTWIDTH] IN BLOOD BY AUTOMATED COUNT: 15.4 % (ref 11.6–15.1)
GFR SERPL CREATININE-BSD FRML MDRD: 49 ML/MIN/1.73SQ M
GLUCOSE SERPL-MCNC: 130 MG/DL (ref 65–140)
HCT VFR BLD AUTO: 39.3 % (ref 34.8–46.1)
HGB BLD-MCNC: 11.5 G/DL (ref 11.5–15.4)
IMM GRANULOCYTES # BLD AUTO: 0.02 THOUSAND/UL (ref 0–0.2)
IMM GRANULOCYTES NFR BLD AUTO: 0 % (ref 0–2)
LACTATE SERPL-SCNC: 1.2 MMOL/L (ref 0.5–2)
LIPASE SERPL-CCNC: 34 U/L (ref 11–82)
LYMPHOCYTES # BLD AUTO: 2.08 THOUSANDS/ΜL (ref 0.6–4.47)
LYMPHOCYTES NFR BLD AUTO: 26 % (ref 14–44)
MCH RBC QN AUTO: 25.7 PG (ref 26.8–34.3)
MCHC RBC AUTO-ENTMCNC: 29.3 G/DL (ref 31.4–37.4)
MCV RBC AUTO: 88 FL (ref 82–98)
MONOCYTES # BLD AUTO: 0.92 THOUSAND/ΜL (ref 0.17–1.22)
MONOCYTES NFR BLD AUTO: 11 % (ref 4–12)
NEUTROPHILS # BLD AUTO: 4.97 THOUSANDS/ΜL (ref 1.85–7.62)
NEUTS SEG NFR BLD AUTO: 60 % (ref 43–75)
NRBC BLD AUTO-RTO: 0 /100 WBCS
PLATELET # BLD AUTO: 219 THOUSANDS/UL (ref 149–390)
PMV BLD AUTO: 10.8 FL (ref 8.9–12.7)
POTASSIUM SERPL-SCNC: 3.8 MMOL/L (ref 3.5–5.3)
PROT SERPL-MCNC: 7.9 G/DL (ref 6.4–8.4)
RBC # BLD AUTO: 4.48 MILLION/UL (ref 3.81–5.12)
SODIUM SERPL-SCNC: 141 MMOL/L (ref 135–147)
WBC # BLD AUTO: 8.17 THOUSAND/UL (ref 4.31–10.16)

## 2024-10-30 PROCEDURE — 99285 EMERGENCY DEPT VISIT HI MDM: CPT

## 2024-10-30 PROCEDURE — 85025 COMPLETE CBC W/AUTO DIFF WBC: CPT | Performed by: EMERGENCY MEDICINE

## 2024-10-30 PROCEDURE — 80053 COMPREHEN METABOLIC PANEL: CPT | Performed by: EMERGENCY MEDICINE

## 2024-10-30 PROCEDURE — 74177 CT ABD & PELVIS W/CONTRAST: CPT

## 2024-10-30 PROCEDURE — 83690 ASSAY OF LIPASE: CPT | Performed by: EMERGENCY MEDICINE

## 2024-10-30 PROCEDURE — 99284 EMERGENCY DEPT VISIT MOD MDM: CPT

## 2024-10-30 PROCEDURE — 96361 HYDRATE IV INFUSION ADD-ON: CPT

## 2024-10-30 PROCEDURE — 96360 HYDRATION IV INFUSION INIT: CPT

## 2024-10-30 PROCEDURE — 83605 ASSAY OF LACTIC ACID: CPT

## 2024-10-30 PROCEDURE — 36415 COLL VENOUS BLD VENIPUNCTURE: CPT

## 2024-10-30 RX ORDER — LOPERAMIDE HYDROCHLORIDE 2 MG/1
2 TABLET ORAL DAILY PRN
Qty: 4 TABLET | Refills: 0 | Status: SHIPPED | OUTPATIENT
Start: 2024-10-30

## 2024-10-30 RX ORDER — SACCHAROMYCES BOULARDII 250 MG
250 CAPSULE ORAL 2 TIMES DAILY
Qty: 10 CAPSULE | Refills: 0 | Status: SHIPPED | OUTPATIENT
Start: 2024-10-30 | End: 2024-11-04

## 2024-10-30 RX ADMIN — IOHEXOL 100 ML: 350 INJECTION, SOLUTION INTRAVENOUS at 20:18

## 2024-10-30 RX ADMIN — SODIUM CHLORIDE 1000 ML: 0.9 INJECTION, SOLUTION INTRAVENOUS at 20:07

## 2024-10-30 NOTE — TELEPHONE ENCOUNTER
S/w pt reviewed that she will use up current supply of medication and follow titration schedule as below, RN reviewed again with pt's caretaker present.

## 2024-10-30 NOTE — TELEPHONE ENCOUNTER
Patient called. Reports experiencing diarrhea. Pt states symptoms began two days ago. Pt had abdominal pain but has resolved. Denied taking OTC medication. Requesting further recommendations.      Please advise.  Thank you

## 2024-10-30 NOTE — TELEPHONE ENCOUNTER
"Kenzie requests PCP recommendation regarding symptoms. She has yellow, watery diarrhea with intermittent abdominal pain for two days. She reports three bouts of diarrhea today. She is not drinking fluids due to concern that she has diarrhea after drinking. She reports she is incontinent of urine and stool.    Advised Emergency Department for evaluation, Kenzie declines. She requests PCP recommendation. She verbalized frustration that she had called previously today and did not receive guidance yet.     Reason for Disposition   Drinking very little and dehydration suspected (e.g., no urine > 12 hours, very dry mouth, very lightheaded)    Answer Assessment - Initial Assessment Questions  1. DIARRHEA SEVERITY: \"How bad is the diarrhea?\" \"How many more stools have you had in the past 24 hours than normal?\"       Three stools today    2. ONSET: \"When did the diarrhea begin?\"       Yesterday, 10/29/2024    3. STOOL DESCRIPTION:  \"How loose or watery is the diarrhea?\" \"What is the stool color?\" \"Is there any blood or mucous in the stool?\"      Watery and yellow    4. VOMITING: \"Are you also vomiting?\" If Yes, ask: \"How many times in the past 24 hours?\"       Denies    5. ABDOMEN PAIN: \"Are you having any abdomen pain?\" If Yes, ask: \"What does it feel like?\" (e.g., crampy, dull, intermittent, constant)       Intermittent abdominal pain    7. ORAL INTAKE: If vomiting, \"Have you been able to drink liquids?\" \"How much liquids have you had in the past 24 hours?\"       Pt reports she is not drinking liquids because diarrhea immediately follows PO intake    8. HYDRATION: \"Any signs of dehydration?\" (e.g., dry mouth [not just dry lips], too weak to stand, dizziness, new weight loss) \"When did you last urinate?\"      Pt states she is \"always dehydrated,\" denies dizziness, weakness    11. OTHER SYMPTOMS: \"Do you have any other symptoms?\" (e.g., fever, blood in stool)        Denies    Protocols used: Diarrhea-Adult-[place]    "

## 2024-10-30 NOTE — ED PROVIDER NOTES
"Time reflects when diagnosis was documented in both MDM as applicable and the Disposition within this note       Time User Action Codes Description Comment    10/30/2024  9:24 PM Shugars, Dinorah Add [R19.7] Diarrhea     10/30/2024  9:24 PM Shugars, Dinorah Add [K60.2] Anal fissure     10/30/2024  9:25 PM Shugars, Dinorah Add [K74.60] Cirrhosis (HCC)     10/30/2024  9:25 PM Shugars, Dinorah Add [R93.5] Abnormal CT of the abdomen           ED Disposition       ED Disposition   Discharge    Condition   Stable    Date/Time   Wed Oct 30, 2024  9:24 PM    Comment   Kenzie Cali discharge to home/self care.                   Assessment & Plan       Medical Decision Making  DDx including but not limited to: food poisoning, viral illness, colitis, enteritis, metabolic abnormality, IBS, IBD, ileus, bowel obstruction, diverticulitis    The patient comes in with 2 days of intermittent abdominal cramping and diarrhea.  She spoke with her primary care doctor in hopes of taking medication to slow her bowels but was referred here for further workup and possible dehydration.  The patient reports she has been able to drink fluids without difficulty but notes they \"run right out of her\".  She overall appears clinically well on exam and nontoxic.  Vitals are stable.  Exam is with no abdominal tenderness or distention.  She does have a history of diverticulitis with complication needing partial colectomy.  Given her age and complaint, will obtain CT imaging to further evaluate for acute partial SBO and diverticulitis.  Labs ordered by first nurse triage RN and overall stable.  Will medicate with fluids as patient does not pain currently.    Problems Addressed:  Abnormal CT of the abdomen: self-limited or minor problem  Anal fissure: acute illness or injury  Cirrhosis (HCC): undiagnosed new problem with uncertain prognosis  Diarrhea: acute illness or injury    Amount and/or Complexity of Data Reviewed  Labs: ordered.  Radiology: ordered. " Decision-making details documented in ED Course.    Risk  OTC drugs.  Prescription drug management.        ED Course as of 10/30/24 2156   Wed Oct 30, 2024   2107 CT abdomen pelvis with contrast  IMPRESSION:     Hepatic cirrhosis with probable portal hypertension including mild splenomegaly and distal esophageal varices.     Mild nonspecific edema along the gastrohepatic ligament.     Scattered colonic diverticulosis without evidence of diverticulitis.     Probable prior transversely oriented sacral insufficiency fracture.     2120 On reevaluation patient resting comfortably.  No further episodes of diarrhea while here for the last 3.5 hours.  No abdominal pain currently, abdominal exam remains benign.  I discussed with her the findings of cirrhosis of the liver but she does deny history of drinking alcohol or smoking cigarettes.  She has no known history of liver disease, will refer to GI given esophageal varices and cirrhotic liver seen on CT imaging.  Will prescribe nifedipine-lidocaine cream for anal fissure.  Will have her start probiotics and hold on loperamide unless diarrhea acutely returns.  Patient to follow-up with primary care in 3 to 5 days for reevaluation.       Medications   sodium chloride 0.9 % bolus 1,000 mL (0 mL Intravenous Stopped 10/30/24 2140)   iohexol (OMNIPAQUE) 350 MG/ML injection (MULTI-DOSE) 100 mL (100 mL Intravenous Given 10/30/24 2018)       ED Risk Strat Scores                           SBIRT 22yo+      Flowsheet Row Most Recent Value   Initial Alcohol Screen: US AUDIT-C     1. How often do you have a drink containing alcohol? 0 Filed at: 10/30/2024 1921   2. How many drinks containing alcohol do you have on a typical day you are drinking?  0 Filed at: 10/30/2024 1921   3a. Male UNDER 65: How often do you have five or more drinks on one occasion? 0 Filed at: 10/30/2024 1921   3b. FEMALE Any Age, or MALE 65+: How often do you have 4 or more drinks on one occassion? 0 Filed at:  10/30/2024 1921   Audit-C Score 0 Filed at: 10/30/2024 1921   LORI: How many times in the past year have you...    Used an illegal drug or used a prescription medication for non-medical reasons? Never Filed at: 10/30/2024 1921                            History of Present Illness       Chief Complaint   Patient presents with    Dehydration     Sent to ed by PCP who is concerned about dehydration due to diarrhea for 2 days.        Past Medical History:   Diagnosis Date    Arthritis     Cataract 03/2023    Deep vein thrombosis (HCC)     Diabetes mellitus (HCC)     Diverticulitis     Hypertension     Hypothyroid     Osteoarthritis     Pneumonia     Pulmonary fibrosis (HCC)       Past Surgical History:   Procedure Laterality Date    CATARACT EXTRACTION      CHOLECYSTECTOMY      SKIN CANCER EXCISION        Family History   Problem Relation Age of Onset    Breast cancer Mother     Heart disease Father       Social History     Tobacco Use    Smoking status: Never    Smokeless tobacco: Never   Vaping Use    Vaping status: Never Used   Substance Use Topics    Alcohol use: Not Currently    Drug use: Never      E-Cigarette/Vaping    E-Cigarette Use Never User       E-Cigarette/Vaping Substances    Nicotine No     THC No     CBD No     Flavoring No     Other No     Unknown No       I have reviewed and agree with the history as documented.     The patient is an 79-year-old female with PMH of HTN, pulmonary fibrosis, diverticulitis, and T2DM presenting for evaluation of diarrhea.  The patient awoke very early yesterday morning at 2 AM (36 hours PTA) with abdominal cramping and urge to defecate.  She notes over the last day and a half she has had mild generalized abdominal pain, intermittent cramping, with diarrhea-like stools described as loose, watery, yellow.  She did have 1 episode that was dark brown to black but has not had any bright red blood or any further melanotic appearing stools.  She does report burning discomfort to  her anal sphincter.  She denies associated fevers, chills, chest pain, shortness of breath, nausea, or vomiting.  She denies eating raw fish, undercooked meats, drinking unfiltered water, recent travel outside [place], and known sick contacts.      History provided by:  Patient   used: No        Review of Systems   Constitutional:  Negative for chills and fever.   Respiratory:  Negative for shortness of breath.    Cardiovascular:  Negative for chest pain.   Gastrointestinal:  Positive for abdominal pain and diarrhea. Negative for nausea and vomiting.   All other systems reviewed and are negative.          Objective       ED Triage Vitals [10/30/24 1803]   Temperature Pulse Blood Pressure Respirations SpO2 Patient Position - Orthostatic VS   98.2 °F (36.8 °C) 72 140/68 16 96 % Sitting      Temp Source Heart Rate Source BP Location FiO2 (%) Pain Score    Oral Monitor Right arm -- No Pain      Vitals      Date and Time Temp Pulse SpO2 Resp BP Pain Score FACES Pain Rating User   10/30/24 2130 -- 90 94 % 16 175/75 -- --    10/30/24 2100 -- 90 93 % -- 172/80 -- --    10/30/24 2030 -- 94 93 % -- 189/77 -- --    10/30/24 2000 -- 76 93 % 18 162/73 -- -- [place]   10/30/24 1930 -- 75 93 % 16 183/74 -- --    10/30/24 1915 -- 75 95 % 16 155/67 -- --    10/30/24 1803 98.2 °F (36.8 °C) 72 96 % 16 140/68 No Pain -- BY            Physical Exam  Vitals and nursing note reviewed.   Constitutional:       General: She is not in acute distress.     Appearance: Normal appearance. She is normal weight. She is not ill-appearing, toxic-appearing or diaphoretic.   HENT:      Head: Normocephalic and atraumatic.      Nose: Nose normal.      Mouth/Throat:      Mouth: Mucous membranes are moist.      Pharynx: Oropharynx is clear.   Eyes:      Extraocular Movements: Extraocular movements intact.      Conjunctiva/sclera: Conjunctivae normal.   Cardiovascular:      Rate and Rhythm: Normal rate.   Pulmonary:       Effort: Pulmonary effort is normal. No respiratory distress.   Abdominal:      General: There is no distension.      Palpations: Abdomen is soft.      Tenderness: There is no abdominal tenderness. There is no guarding or rebound.   Musculoskeletal:         General: Normal range of motion.      Cervical back: Normal range of motion and neck supple.   Skin:     General: Skin is warm and dry.      Capillary Refill: Capillary refill takes less than 2 seconds.   Neurological:      General: No focal deficit present.      Mental Status: She is alert and oriented to person, place, and time. Mental status is at baseline.         Results Reviewed       Procedure Component Value Units Date/Time    Lactic acid, plasma (w/reflex if result > 2.0) [[id number]]  (Normal) Collected: 10/30/24 2008    Lab Status: Final result Specimen: Blood from Arm, Left Updated: 10/30/24 2027     LACTIC ACID 1.2 mmol/L     Narrative:      Result may be elevated if tourniquet was used during collection.    Comprehensive metabolic panel [[id number]]  (Abnormal) Collected: 10/30/24 1810    Lab Status: Final result Specimen: Blood from Arm, Left Updated: 10/30/24 1909     Sodium 141 mmol/L      Potassium 3.8 mmol/L      Chloride 113 mmol/L      CO2 20 mmol/L      ANION GAP 8 mmol/L      BUN 21 mg/dL      Creatinine 1.07 mg/dL      Glucose 130 mg/dL      Calcium 9.3 mg/dL      AST 63 U/L      ALT 37 U/L      Alkaline Phosphatase 56 U/L      Total Protein 7.9 g/dL      Albumin 4.1 g/dL      Total Bilirubin 0.63 mg/dL      eGFR 49 ml/min/1.73sq m     Narrative:      National Kidney Disease Foundation guidelines for Chronic Kidney Disease (CKD):     Stage 1 with normal or high GFR (GFR > 90 mL/min/1.73 square meters)    Stage 2 Mild CKD (GFR = 60-89 mL/min/1.73 square meters)    Stage 3A Moderate CKD (GFR = 45-59 mL/min/1.73 square meters)    Stage 3B Moderate CKD (GFR = 30-44 mL/min/1.73 square meters)    Stage 4 Severe CKD (GFR = 15-29 mL/min/1.73 square  meters)    Stage 5 End Stage CKD (GFR <15 mL/min/1.73 square meters)  Note: GFR calculation is accurate only with a steady state creatinine    Lipase [[id number]]  (Normal) Collected: 10/30/24 1810    Lab Status: Final result Specimen: Blood from Arm, Left Updated: 10/30/24 1909     Lipase 34 u/L     CBC and differential [[id number]]  (Abnormal) Collected: 10/30/24 1810    Lab Status: Final result Specimen: Blood from Arm, Left Updated: 10/30/24 1817     WBC 8.17 Thousand/uL      RBC 4.48 Million/uL      Hemoglobin 11.5 g/dL      Hematocrit 39.3 %      MCV 88 fL      MCH 25.7 pg      MCHC 29.3 g/dL      RDW 15.4 %      MPV 10.8 fL      Platelets 219 Thousands/uL      nRBC 0 /100 WBCs      Segmented % 60 %      Immature Grans % 0 %      Lymphocytes % 26 %      Monocytes % 11 %      Eosinophils Relative 2 %      Basophils Relative 1 %      Absolute Neutrophils 4.97 Thousands/µL      Absolute Immature Grans 0.02 Thousand/uL      Absolute Lymphocytes 2.08 Thousands/µL      Absolute Monocytes 0.92 Thousand/µL      Eosinophils Absolute 0.13 Thousand/µL      Basophils Absolute 0.05 Thousands/µL             CT abdomen pelvis with contrast   Final Interpretation by Ari Ly MD (10/30 2054)      Hepatic cirrhosis with probable portal hypertension including mild splenomegaly and distal esophageal varices.      Mild nonspecific edema along the gastrohepatic ligament.      Scattered colonic diverticulosis without evidence of diverticulitis.      Probable prior transversely oriented sacral insufficiency fracture.      Workstation performed: [id number]             Procedures    ED Medication and Procedure Management   Prior to Admission Medications   Prescriptions Last Dose Informant Patient Reported? Taking?   amLODIPine (NORVASC) 10 mg tablet   No No   Sig: Take 1 tablet (10 mg total) by mouth daily   apixaban (Eliquis) 5 mg   Yes No   Sig: Take 5 mg by mouth 2 (two) times a day   atorvastatin (LIPITOR) 20 mg tablet   Yes  No   Sig: Take 20 mg by mouth daily   denosumab (PROLIA) 60 mg/mL   No No   Sig: Inject 1 mL (60 mg total) under the skin once for 1 dose   gabapentin (NEURONTIN) 100 mg capsule   No No   Sig: Take 1 capsule (100 mg total) by mouth 3 (three) times a day Take 1 tabs QD x 3 days, then 1 tab BID x 3 days, then increase to TID   levothyroxine 137 mcg tablet   No No   Sig: Take 1 tablet (137 mcg total) by mouth daily in the early morning   nystatin (MYCOSTATIN) cream   No No   Sig: Apply topically 2 (two) times a day   omeprazole (PriLOSEC) 20 mg delayed release capsule   No No   Sig: TAKE 1 CAPSULE (20 MG TOTAL) BY MOUTH AS NEEDED (AS NEEDED)      Facility-Administered Medications: None     Discharge Medication List as of 10/30/2024  9:30 PM        START taking these medications    Details   loperamide (IMODIUM A-D) 2 MG tablet Take 1 tablet (2 mg total) by mouth daily as needed for diarrhea, Starting Wed 10/30/2024, Normal      NIFEdipine 0.3%-lidocaine 5% rectal ointment Apply 1 Application topically every 6 (six) hours as needed for discomfort or pain for up to 5 days Apply a small amount to anal fissure, Starting Wed 10/30/2024, Until Mon 11/4/2024 at 2359, Normal      saccharomyces boulardii (FLORASTOR) 250 mg capsule Take 1 capsule (250 mg total) by mouth 2 (two) times a day for 5 days, Starting Wed 10/30/2024, Until Mon 11/4/2024, Normal           CONTINUE these medications which have NOT CHANGED    Details   amLODIPine (NORVASC) 10 mg tablet Take 1 tablet (10 mg total) by mouth daily, Starting Tue 6/18/2024, Normal      apixaban (Eliquis) 5 mg Take 5 mg by mouth 2 (two) times a day, Historical Med      atorvastatin (LIPITOR) 20 mg tablet Take 20 mg by mouth daily, Starting Wed 11/9/2022, Historical Med      denosumab (PROLIA) 60 mg/mL Inject 1 mL (60 mg total) under the skin once for 1 dose, Starting Mon 3/4/2024, Normal      gabapentin (NEURONTIN) 100 mg capsule Take 1 capsule (100 mg total) by mouth 3 (three)  times a day Take 1 tabs QD x 3 days, then 1 tab BID x 3 days, then increase to TID, Starting Mon 9/30/2024, Normal      levothyroxine 137 mcg tablet Take 1 tablet (137 mcg total) by mouth daily in the early morning, Starting Thu 9/12/2024, Normal      nystatin (MYCOSTATIN) cream Apply topically 2 (two) times a day, Starting Thu 9/12/2024, Normal      omeprazole (PriLOSEC) 20 mg delayed release capsule TAKE 1 CAPSULE (20 MG TOTAL) BY MOUTH AS NEEDED (AS NEEDED), Starting Wed 6/19/2024, Normal             ED SEPSIS DOCUMENTATION   Time reflects when diagnosis was documented in both MDM as applicable and the Disposition within this note       Time User Action Codes Description Comment    10/30/2024  9:24 PM Dinorah Frederick Add [R19.7] Diarrhea     10/30/2024  9:24 PM Otoniel Dinorah Add [K60.2] Anal fissure     10/30/2024  9:25 PM Otoniel Dinorah Add [K74.60] Cirrhosis (HCC)     10/30/2024  9:25 PM Shjessie Dinorah Add [R93.5] Abnormal CT of the abdomen                  ALTHEA Casarez  10/30/24 3064

## 2024-10-30 NOTE — TELEPHONE ENCOUNTER
Caller: akila Espino    Doctor: Saurabh     Reason for call: pt stated that the script for the gabapentin with the increased dosage is not at the pharmacy. Pt stated she went to pick it up and they said they do not have it.  Please advise     Call back#: [phone]

## 2024-10-31 ENCOUNTER — VBI (OUTPATIENT)
Dept: FAMILY MEDICINE CLINIC | Facility: CLINIC | Age: 79
End: 2024-10-31

## 2024-10-31 ENCOUNTER — TELEPHONE (OUTPATIENT)
Age: 79
End: 2024-10-31

## 2024-10-31 PROBLEM — K74.60 CIRRHOSIS OF LIVER WITHOUT ASCITES (HCC): Status: ACTIVE | Noted: 2024-10-31

## 2024-10-31 NOTE — TELEPHONE ENCOUNTER
10/31/24 12:07 PM    Patient contacted post ED visit, first outreach attempt made. Message was left for patient to return a call to the VBI Department at [facility]: Phone [phone].    Thank you.  Nevin Diehl MA  [facility]

## 2024-10-31 NOTE — TELEPHONE ENCOUNTER
I spoke with patient. She was seen in ED  yesterday for diarrhea that has been going on for the last 3 days. She had labs done. CBC normal.   BUN and creatinine normal and electrolytes were normal. Slight elevation of her AST which has been consistently elevated.     They did CT of abdomen and pelvis in the ED which showed findings (nodular contour of liver ) suggestive of cirrhosis as well as some portal hypertension and esophageal varices.   Previous us of abdomen  done 1/21/24 showed fatty liver. She was referred to see GI by ED doctor.     Advised patient that the GI office will call her to schedule appt and she should let me know if she does not hear from them.     Her diarrhea is improved but still had 3 bms today  No abdominal pain. No N/V  Recommend fluids and BRAT diet.

## 2024-10-31 NOTE — TELEPHONE ENCOUNTER
Patient called requesting to speak with her PCP regarding results from ED visit yesterday, 10/30/24. Patient verbalizing frustration that provider has not contacted her about these results/her liver at this time. Offered RN assistance or to transfer patient to clinical team, patient declined. Again, requesting to speak with physician directly. Informed patient that provider is currently unavailable (in an appointment) and offered to schedule patient for a same-day appointment to discuss questions/concerns in person. Patient declined, requesting call back today from PCP. Please review and advise.

## 2024-10-31 NOTE — DISCHARGE INSTRUCTIONS
Use the prescribed medications as directed to treat your anal sphincter pain as well as your diarrhea.  Increase your fluids by taking small sips of water throughout the day.  Follow-up with your primary care doctor in 3 to 5 days for reevaluation.  Call the number for the GI (gastroenterology) specialist as needed for persistent symptoms or new or worsening abdominal pain.    Return to the ER if develop fever, vomiting, difficulty breathing, chest pain, weakness, confusion, or lethargy.

## 2024-10-31 NOTE — TELEPHONE ENCOUNTER
10/31/24 12:11 PM    Patient contacted post ED visit, VBI department spoke with patient/caregiver and outreach questions were declined.    Thank you.  Nevin Diehl MA  [facility]

## 2024-11-12 DIAGNOSIS — M47.816 LUMBAR SPONDYLOSIS: ICD-10-CM

## 2024-11-12 DIAGNOSIS — G89.29 CHRONIC BILATERAL LOW BACK PAIN WITHOUT SCIATICA: ICD-10-CM

## 2024-11-12 DIAGNOSIS — M54.50 CHRONIC BILATERAL LOW BACK PAIN WITHOUT SCIATICA: ICD-10-CM

## 2024-11-12 RX ORDER — GABAPENTIN 100 MG/1
200 CAPSULE ORAL 3 TIMES DAILY
Qty: 180 CAPSULE | Refills: 2 | Status: SHIPPED | OUTPATIENT
Start: 2024-11-12

## 2024-11-18 DIAGNOSIS — Z86.711 HISTORY OF PULMONARY EMBOLISM: ICD-10-CM

## 2024-11-18 DIAGNOSIS — Z86.718 HISTORY OF DVT OF LOWER EXTREMITY: Primary | ICD-10-CM

## 2024-11-19 RX ORDER — APIXABAN 5 MG/1
5 TABLET, FILM COATED ORAL 2 TIMES DAILY
Qty: 180 TABLET | Refills: 3 | OUTPATIENT
Start: 2024-11-19

## 2024-11-19 NOTE — TELEPHONE ENCOUNTER
Okay,that was her pcp at [facility].   I will refill today but certainly concerning that she has not picked up since April!!!

## 2024-11-19 NOTE — TELEPHONE ENCOUNTER
Called [facility] and spoke with rep who did not give his name. Confirmed this medication was last picked up in April and was prescribed by Colette Cooley.

## 2024-11-19 NOTE — TELEPHONE ENCOUNTER
Patient called to check the status of refill request, patient only has 1 dose left for tonight. Asked that this please be sent into the pharmacy as soon as possible.

## 2024-12-16 ENCOUNTER — OFFICE VISIT (OUTPATIENT)
Dept: GASTROENTEROLOGY | Facility: CLINIC | Age: 79
End: 2024-12-16
Payer: COMMERCIAL

## 2024-12-16 VITALS
HEIGHT: 62 IN | BODY MASS INDEX: 30.07 KG/M2 | DIASTOLIC BLOOD PRESSURE: 64 MMHG | WEIGHT: 163.4 LBS | SYSTOLIC BLOOD PRESSURE: 139 MMHG

## 2024-12-16 DIAGNOSIS — R19.7 DIARRHEA: ICD-10-CM

## 2024-12-16 DIAGNOSIS — R93.5 ABNORMAL CT OF THE ABDOMEN: ICD-10-CM

## 2024-12-16 DIAGNOSIS — Z11.59 ENCOUNTER FOR SCREENING FOR OTHER VIRAL DISEASES: ICD-10-CM

## 2024-12-16 DIAGNOSIS — Z79.01 ANTICOAGULATED: Primary | ICD-10-CM

## 2024-12-16 DIAGNOSIS — K59.00 CONSTIPATION, UNSPECIFIED CONSTIPATION TYPE: ICD-10-CM

## 2024-12-16 DIAGNOSIS — K74.60 CIRRHOSIS (HCC): ICD-10-CM

## 2024-12-16 PROCEDURE — 99204 OFFICE O/P NEW MOD 45 MIN: CPT | Performed by: PHYSICIAN ASSISTANT

## 2024-12-16 RX ORDER — POLYETHYLENE GLYCOL 3350 17 G/17G
17 POWDER, FOR SOLUTION ORAL 2 TIMES DAILY
Qty: 850 G | Refills: 3 | Status: SHIPPED | OUTPATIENT
Start: 2024-12-16

## 2024-12-16 NOTE — PATIENT INSTRUCTIONS
-start Miralax 1-2 times daily, avoid imodium  -cirrhosis precautions reviewed  -labs  -6w OV physician or hepatology

## 2024-12-16 NOTE — PROGRESS NOTES
[facility] - Outpatient Consultation  Kenzie Cali 79 y.o. female MRN: [id number]  Encounter: [id number]    ASSESSMENT AND PLAN:    1. Diarrhea  2. Constipation, unspecified constipation type  Patient with intermittent diarrhea with eating salads improved with Imodium.  Otherwise has scybalous stools.  CT scan personally reviewed and showed large formed stool in colon.  Suspect patient experiencing paradoxical diarrhea.  Advised to start MiraLAX 1-2 times daily and get plenty of fluid and fiber in her diet.  Can consider colonoscopy to evaluate change in bowel habits if endoscopy pursued in future for variceal screening and when Eliquis held.  Does have remote history of rectosigmoid resection for diverticulitis in 2007 and C. difficile in 2021 doubt recurrent infection given lack of abdominal pain and ongoing diarrhea.    - Ambulatory Referral to Gastroenterology  - polyethylene glycol (GLYCOLAX) 17 GM/SCOOP powder; Take 17 g by mouth 2 (two) times a day  Dispense: 850 g; Refill: 3    3. Cirrhosis (HCC)  4. Abnormal CT of the abdomen  Pt with fatty liver on ultrasound earlier this year, AST > ALT.  CT shows incidental cirrhosis with probable portal hypertension with mild splenomegaly and distal esophageal varices, nonspecific edema along gastrohepatic ligament.  Pt denies history of liver disease or cirrhosis.  Will check complete chronic liver disease labs and calculate fib 4/MELD to confirm cirrhosis and evaluate etiology although unlikely candidate for transplant given age and comorbidities.  The pathogenesis of cirrhosis and potential complications including esophageal varices, HCC, ascites, encephalopathy were reviewed with the patient.  She states even at her age and comorbidities she would pursue treatment.  No evidence of HCC on recent CT scan but will check AFP.  She is at high risk for screening endoscopy given pulmonary fibrosis and chronic respiratory failure  with chronic dyspnea while also on Eliquis for history of DVT/PE.  Will refer to hepatology for evaluation and management.  She is already following low salt diet.  Patient advised to contact our office if she develops signs of abdominal distention lower extremity edema, confusion, GI bleeding, etc.     Health Maintenance:  Patient counseled to avoid alcohol and tobacco products.  Patient was also advised to avoid raw seafood/oysters and from swimming in unchlorinated adhikari, particularly from the [facility], due to increased risk of infection from Vibrio Vulnificus.  Patient was also instructed to seek immediate medical attention should he develop fevers over 101 F, evidence of GI bleeding (black or bloody stools, bloody or coffee ground emesis) or confusion.  Patient was advised to avoid NSAIDs, if possible, due to increase risk of renal dysfunction, and advised that if he should use acetaminophen, dose should not exceed 2 grams/day.  Patient was recommend to remain up to date with adult vaccination, including influenza, pneumovax and meningococcus. Inactivated HSV and zoster vaccine is safe and encouraged by PCP.  Patient was instructed to call either our office or that of his referring doctor should he develop worsening symptoms related to lower extremity edema, ascites, jaundice or excessive bruising/bleeding.    - Ambulatory Referral to Gastroenterology  - CBC; Future  - Comprehensive metabolic panel; Future  - Protime-INR; Future  - HAV antibody w/ rfx; Future  - Hepatitis B surface antibody; Future  - Hepatitis B surface antigen; Future  - Hepatitis B core antibody, total; Future  - HCV Ab w/Rflx to Verification; Future  - AFP tumor marker; Future  - Fe+TIBC+Mike  - Ceruloplasmin; Future  - Alpha-1-Antitrypsin Deficiency; Future  - Antimitochondrial antibody; Future  - Anti-smooth muscle antibody, IgG; Future  - ROHIT Screen w/Reflex Cascade; Future    5. Anticoagulated (Primary)  On Eliquis for history of  DVT PE.  Would need clearance to hold 2 days prior to endoscopic procedure if pursued in future.      Follow up Appointment: 6w OV hepatology    Chief Complaint   Patient presents with   • Follow-up     ED          HPI:   [facility]evin (home health aid present)(pt lives alone, aid and son help care for pt)  Kenzie Cali is a 79 y.o. 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 difficile in 2021 treated with vancomycin being evaluated  after ER eval for diarrhea and incidental cirrhosis on CT. eyes ever having colonoscopy.  Reports normal endoscopy over 10 years ago.  No family history of GI malignancy or liver disease.  Patient alert and orient x 3 but appears confused at times and is a poor historian.    Eval at [facility] ED October 2024 for cramping and diarrhea for 2 days.  CT showed cirrhosis with probable portal hypertension , found to have anal fissure given nifedipine cream referred to GI.    4/2024 celiac panel normal but IgA elevated.  August 2024 TSH low.  October 2024 CBC normal.  CMP showed creatinine 1.07 GFR 49, ALT 37, AST 63.    RUQ US 1/2024 for benji LFT's showed fatty liver, status post herminio, neg sylvie dil.     CT A/P with contrast 10/2024 s/p cholecystectomy without biliary dilation, rectal colonic anastomosis from prior rectosigmoid resection, hepatic cirrhosis with probable portal hypertension including mild splenomegaly and distal esophageal varices, mild nonspecific edema along the gastrohepatic ligament, Scattered colonic diverticulosis without evidence of diverticulitis, Probable prior transversely oriented sacral insufficiency fracture.  On personal review large formed stool in colon.    Patient reports she has daily scybalous stools but has  not tried anything for constipation.  She has intermittent diarrhea with eating solids now improved with Imodium as needed.  Reports she has been avoiding salt and sugar and has lost 7 pounds intentionally.  Notes chronically poor appetite, but is eating ok.  Has had acid reflux for over 10 years controlled with omeprazole 20 mg daily.  Denies NSAID use.  Denies any history of liver disease, cirrhosis, alcohol, drug use, viral hepatitis.  Denies abdominal distention or altered mental status although aide states she is confused at times.  She is alert and x 3 today without asterixis on exam but appears confused and is a poor historian at times.  No day night reversal.  States she is always short of breath but does not use oxygen.  Remains on apixaban for history of DVT/PE.    Pt denies all other GI and constitutional symptoms.      ROS:   Constitutional: denies fatigue, fever.  HEENT: denies visual disturbance, postnasal drip, sore throat.  Respiratory: denies cough, +shortness of breath.  Cardiovascular: denies chest pain, leg swelling.  Gastrointestinal: as noted above in HPI.  : denies difficulty urinating, dysuria. + urinary incontinence  Musculoskeletal: denies arthralgias, +back pain.  Neurological: denies dizziness, syncope.  Psychiatric: denies confusion, anxiety.    Historical Information   Past Medical History:   Diagnosis Date   • Arthritis    • Cataract 03/2023   • Deep vein thrombosis (HCC)    • Diabetes mellitus (HCC)    • Diverticulitis    • Hypertension    • Hypothyroid    • Osteoarthritis    • Pneumonia    • Pulmonary fibrosis (HCC)      Past Surgical History:   Procedure Laterality Date   • CATARACT EXTRACTION     • CHOLECYSTECTOMY     • SKIN CANCER EXCISION       Social History     Substance and Sexual Activity   Alcohol Use Not Currently     Social History     Substance and Sexual Activity   Drug Use Never     Social History     Tobacco Use   Smoking Status Never   Smokeless Tobacco Never  "    Family History   Problem Relation Age of Onset   • Breast cancer Mother    • Heart disease Father    • Colon cancer Neg Hx    • Colon polyps Neg Hx        Meds/Allergies     Current Outpatient Medications:   •  amLODIPine (NORVASC) 10 mg tablet  •  apixaban (Eliquis) 5 mg  •  atorvastatin (LIPITOR) 20 mg tablet  •  gabapentin (NEURONTIN) 100 mg capsule  •  levothyroxine 137 mcg tablet  •  loperamide (IMODIUM A-D) 2 MG tablet  •  nystatin (MYCOSTATIN) cream  •  omeprazole (PriLOSEC) 20 mg delayed release capsule  •  polyethylene glycol (GLYCOLAX) 17 GM/SCOOP powder  •  denosumab (PROLIA) 60 mg/mL    No Known Allergies    PHYSICAL EXAM:    Blood pressure 139/64, height 5' 2\" (1.575 m), weight 74.1 kg (163 lb 6.4 oz). Body mass index is 29.89 kg/m².    Constitutional: Well-developed, no acute distress  HEENT: normocephalic, mucous membranes moist. Vascular ectasias on cheeks B/L  Neck: Supple  Skin: warm and dry  Respiratory: Rales B/L, dyspnea with speaking  Cardiovascular: Heart is regular rate and rhythm.  Gastrointestinal: Soft, nontender, nondistended with normal active bowel sounds.  No masses, guarding, rebound.   Rectal Exam: Deferred.  Extremities: No edema.  Neurologic: Nonfocal. A & O ×3. No asterixis.  Psychiatric: Normal affect.    Lab Results:   As above    Radiology Results:   No results found.    Patient expressed understanding and had all questions and concerns addressed.    Advised patient to call with any questions, failure to improve, or if symptoms change or worsen.    Molly Olson PA-C  12/16/24  12:57 PM    This chart was completed in part utilizing [employer] speech voice recognition software. Random word insertions, pronoun errors, and incomplete sentences are an occasional consequence of this system due to software limitations, and ambient noise. Any questions or concerns about the content, text, or information contained within the body of this dictation should be directly " addressed to the provider for clarification.

## 2024-12-19 ENCOUNTER — TELEPHONE (OUTPATIENT)
Age: 79
End: 2024-12-19

## 2024-12-19 DIAGNOSIS — M47.816 LUMBAR SPONDYLOSIS: Primary | ICD-10-CM

## 2024-12-19 RX ORDER — GABAPENTIN 300 MG/1
300 CAPSULE ORAL 3 TIMES DAILY
Qty: 90 CAPSULE | Refills: 2 | Status: SHIPPED | OUTPATIENT
Start: 2024-12-19

## 2024-12-19 NOTE — TELEPHONE ENCOUNTER
Is patient having any side effects from the medication at 200 mg 3 times daily?  We could increase 1 more time to 300 mg 3 times daily.   patient also has the option of discontinuing the gabapentin and trying Cymbalta instead.

## 2024-12-19 NOTE — TELEPHONE ENCOUNTER
S/w Pt and her daughter to reschedule ov with Susan per Susan - 1/16    Susan said she would send a refill for Gabapentin. Pt states that the medication isnt working. Pt would like to discuss meds

## 2024-12-19 NOTE — TELEPHONE ENCOUNTER
S/w pt, confirmed gabapentin 100 mg, 2 pills po tid with no relief, no se's. Advised of above. Pt would like to increase to 3 pills po tid. Advised pt, this increase may cause drowsiness. Do not drive or operate machinery until you are familiar with how this increase may affect you. Pt verbalized understanding and requested the rx be sent to [facility] on [address] in [place]. Advised pt, the writer will make MG aware.

## 2024-12-19 NOTE — TELEPHONE ENCOUNTER
Caller: Pt    Doctor: Susan Wiley    Reason for call: Pt is having car issues and cannot make it to appt on time. Reached out  Office to se if they can assist or make virtual.    They will get back to her once they speak to NP.    Call back#: [phone]

## 2025-01-16 ENCOUNTER — OFFICE VISIT (OUTPATIENT)
Dept: PAIN MEDICINE | Facility: CLINIC | Age: 80
End: 2025-01-16
Payer: COMMERCIAL

## 2025-01-16 VITALS — TEMPERATURE: 98.1 F | BODY MASS INDEX: 29.89 KG/M2 | HEIGHT: 62 IN | HEART RATE: 80 BPM

## 2025-01-16 DIAGNOSIS — M47.816 LUMBAR SPONDYLOSIS: ICD-10-CM

## 2025-01-16 DIAGNOSIS — M54.16 LUMBAR RADICULOPATHY: Primary | ICD-10-CM

## 2025-01-16 DIAGNOSIS — M54.41 CHRONIC BILATERAL LOW BACK PAIN WITH BILATERAL SCIATICA: ICD-10-CM

## 2025-01-16 DIAGNOSIS — M54.42 CHRONIC BILATERAL LOW BACK PAIN WITH BILATERAL SCIATICA: ICD-10-CM

## 2025-01-16 DIAGNOSIS — G89.29 CHRONIC BILATERAL LOW BACK PAIN WITH BILATERAL SCIATICA: ICD-10-CM

## 2025-01-16 PROCEDURE — 99214 OFFICE O/P EST MOD 30 MIN: CPT | Performed by: PHYSICIAN ASSISTANT

## 2025-01-16 RX ORDER — DULOXETIN HYDROCHLORIDE 30 MG/1
30 CAPSULE, DELAYED RELEASE ORAL DAILY
Qty: 30 CAPSULE | Refills: 2 | Status: SHIPPED | OUTPATIENT
Start: 2025-01-16

## 2025-01-16 NOTE — PROGRESS NOTES
Assessment:  1. Lumbar radiculopathy    2. Chronic bilateral low back pain with bilateral sciatica    3. Lumbar spondylosis        Plan:  While the patient was in the office today, I did have a thorough conversation regarding their chronic pain syndrome, medication management, and treatment plan options.    After discussing options, we will wean the patient off of the gabapentin as she has not found any benefit from it.  Then the patient will start duloxetine 30 mg daily.  Titrations can be made if indicated and if tolerated.    An epidural steroid injection was discussed with the patient and at first she expressed interest.  I will schedule her then went into the room with her to discuss the procedure and the Eliquis hold and at that point the patient stated that she did not want an injection.    Patient declined physical therapy.    The patient will follow-up in 12 weeks for medication prescription refill and reevaluation. The patient was advised to contact the office should their symptoms worsen in the interim. The patient was agreeable and verbalized an understanding.        History of Present Illness:    The patient is a 79 y.o. female last seen on 9/30/2024 who presents for a follow up office visit in regards to chronic low back pain secondary to lumbar spondylosis.  The patient currently reports low back pain that she rates a 10 out of 10 on today's visit.  She describes the pain as a constant pressure-like pain that radiates into the lower extremities.  She has significant increase in pain with standing or walking and near complete relief with sitting and laying down.  Patient states that her pain interferes with her daily living activities.  She has not been attending physical therapy and has no interest in doing so.  Last visit we initiated gabapentin and she is now at 300 mg 3 times daily and reports no relief.  Patient reports no side effects on medication.  She states she had injections years ago with  no relief but cannot recall when or what type of injections.    I have personally reviewed and/or updated the patient's past medical history, past surgical history, family history, social history, current medications, allergies, and vital signs today.       Review of Systems:    Review of Systems   Respiratory:  Negative for shortness of breath.    Cardiovascular:  Negative for chest pain.   Gastrointestinal:  Negative for constipation, diarrhea, nausea and vomiting.   Musculoskeletal:  Negative for arthralgias, gait problem, joint swelling and myalgias.   Skin:  Negative for rash.   Neurological:  Negative for dizziness, seizures and weakness.   All other systems reviewed and are negative.        Past Medical History:   Diagnosis Date   • Arthritis    • Cataract 03/2023   • Deep vein thrombosis (HCC)    • Diabetes mellitus (HCC)    • Diverticulitis    • Hypertension    • Hypothyroid    • Osteoarthritis    • Pneumonia    • Pulmonary fibrosis (HCC)        Past Surgical History:   Procedure Laterality Date   • CATARACT EXTRACTION     • CHOLECYSTECTOMY     • SKIN CANCER EXCISION         Family History   Problem Relation Age of Onset   • Breast cancer Mother    • Heart disease Father    • Colon cancer Neg Hx    • Colon polyps Neg Hx        Social History     Occupational History   • Not on file   Tobacco Use   • Smoking status: Never   • Smokeless tobacco: Never   Vaping Use   • Vaping status: Never Used   Substance and Sexual Activity   • Alcohol use: Not Currently   • Drug use: Never   • Sexual activity: Not Currently     Partners: Male         Current Outpatient Medications:   •  amLODIPine (NORVASC) 10 mg tablet, Take 1 tablet (10 mg total) by mouth daily, Disp: 90 tablet, Rfl: 1  •  apixaban (Eliquis) 5 mg, Take 1 tablet (5 mg total) by mouth 2 (two) times a day, Disp: 60 tablet, Rfl: 0  •  atorvastatin (LIPITOR) 20 mg tablet, Take 20 mg by mouth daily, Disp: , Rfl:   •  denosumab (PROLIA) 60 mg/mL, Inject 1 mL (60  "mg total) under the skin once for 1 dose, Disp: 1 mL, Rfl: 0  •  DULoxetine (CYMBALTA) 30 mg delayed release capsule, Take 1 capsule (30 mg total) by mouth daily, Disp: 30 capsule, Rfl: 2  •  levothyroxine 137 mcg tablet, Take 1 tablet (137 mcg total) by mouth daily in the early morning, Disp: 100 tablet, Rfl: 3  •  loperamide (IMODIUM A-D) 2 MG tablet, Take 1 tablet (2 mg total) by mouth daily as needed for diarrhea, Disp: 4 tablet, Rfl: 0  •  nystatin (MYCOSTATIN) cream, Apply topically 2 (two) times a day, Disp: 30 g, Rfl: 1  •  omeprazole (PriLOSEC) 20 mg delayed release capsule, TAKE 1 CAPSULE (20 MG TOTAL) BY MOUTH AS NEEDED (AS NEEDED), Disp: 90 capsule, Rfl: 1  •  polyethylene glycol (GLYCOLAX) 17 GM/SCOOP powder, Take 17 g by mouth 2 (two) times a day, Disp: 850 g, Rfl: 3    No Known Allergies    Physical Exam:    Pulse 80   Temp 98.1 °F (36.7 °C)   Ht 5' 2\" (1.575 m) Comment: Verbal  BMI 29.89 kg/m²     Constitutional:normal, well developed, well nourished, alert, in no distress and non-toxic and no overt pain behavior. and overweight  Eyes:anicteric  HEENT:grossly intact  Neck:supple, symmetric, trachea midline and no masses   Pulmonary:even and unlabored  Cardiovascular:No edema or pitting edema present  Skin:Normal without rashes or lesions and well hydrated  Psychiatric:Mood and affect appropriate  Neurologic:Cranial Nerves II-XII grossly intact  Musculoskeletal:in wheelchair      Imaging  FL spine and pain procedure    (Results Pending)         Orders Placed This Encounter   Procedures   • FL spine and pain procedure       "

## 2025-01-24 ENCOUNTER — TELEPHONE (OUTPATIENT)
Age: 80
End: 2025-01-24

## 2025-01-24 NOTE — TELEPHONE ENCOUNTER
Caller: Jose    Doctor/Office: NA    Call regarding :  Pt thought she had appt with [facility] on Monday 1/27 however it was with Gastro.      Call was transferred to: Gastro scheduling

## 2025-01-28 DIAGNOSIS — Z86.711 HISTORY OF PULMONARY EMBOLISM: ICD-10-CM

## 2025-01-28 DIAGNOSIS — Z86.718 HISTORY OF DVT OF LOWER EXTREMITY: ICD-10-CM

## 2025-01-28 DIAGNOSIS — E03.9 HYPOTHYROIDISM, UNSPECIFIED TYPE: ICD-10-CM

## 2025-01-28 RX ORDER — AMLODIPINE BESYLATE 10 MG/1
10 TABLET ORAL DAILY
Qty: 90 TABLET | Refills: 1 | Status: SHIPPED | OUTPATIENT
Start: 2025-01-28

## 2025-01-28 NOTE — TELEPHONE ENCOUNTER
Medication:  amLODIPine (NORVASC) 10 mg tablet    Dose/Frequency:   Take 1 tablet (10 mg total) by mouth daily        Quantity: 90    Pharmacy: [facility]/pharmacy #[id number] - [address] [facility] [address]     Office:   [x] PCP/Provider -   [] Speciality/Provider -     Does the patient have enough for 3 days?   [] Yes   [x] No - Send as HP to POD      Medication:  apixaban (Eliquis) 5 mg    Dose/Frequency:   Take 1 tablet (5 mg total) by mouth 2 (two) times a day        Quantity: 60    Pharmacy: [facility]/pharmacy #[facility] [address] [facility] [address]     Office:   [x] PCP/Provider -   [] Speciality/Provider -     Does the patient have enough for 3 days?   [] Yes   [x] No - Send as HP to POD    Medication:  omeprazole (PriLOSEC) 20 mg delayed release capsule    Dose/Frequency:   TAKE 1 CAPSULE (20 MG TOTAL) BY MOUTH AS NEEDED (AS NEEDED)  Quantity: 90    Pharmacy: [facility]/pharmacy #[id number]  [place] [address] [address]     Office:   [x] PCP/Provider -   [] Speciality/Provider -     Does the patient have enough for 3 days?   [] Yes   [x] No - Send as HP to POD  '

## 2025-02-20 ENCOUNTER — TELEPHONE (OUTPATIENT)
Dept: FAMILY MEDICINE CLINIC | Facility: CLINIC | Age: 80
End: 2025-02-20

## 2025-02-20 NOTE — TELEPHONE ENCOUNTER
Patient will be due for her next Prolia injection next month. At this time she does not have an appointment scheduled.    She is also overdue for her DM Foot exam and AWV.    Spoke with patient and scheduled for 3/13/25.    Patient will need a CMP prior to administration.     has been advised and will place order.

## 2025-03-12 ENCOUNTER — TELEPHONE (OUTPATIENT)
Dept: FAMILY MEDICINE CLINIC | Facility: CLINIC | Age: 80
End: 2025-03-12

## 2025-03-12 DIAGNOSIS — E78.00 HYPERCHOLESTEROLEMIA: ICD-10-CM

## 2025-03-12 DIAGNOSIS — E03.9 HYPOTHYROIDISM, UNSPECIFIED TYPE: ICD-10-CM

## 2025-03-12 DIAGNOSIS — N18.31 TYPE 2 DIABETES MELLITUS WITH STAGE 3A CHRONIC KIDNEY DISEASE, WITHOUT LONG-TERM CURRENT USE OF INSULIN (HCC): Primary | ICD-10-CM

## 2025-03-12 DIAGNOSIS — E11.22 TYPE 2 DIABETES MELLITUS WITH STAGE 3A CHRONIC KIDNEY DISEASE, WITHOUT LONG-TERM CURRENT USE OF INSULIN (HCC): Primary | ICD-10-CM

## 2025-03-12 NOTE — ASSESSMENT & PLAN NOTE
Patient following with pulmonology, Dr. Moralez at [facility].   Had PFT's done 12/13/23 and showed:  No obstructive airflow defect    Spirometry suggests restriction    No significant response to the administration of bronchodilator per ATS Standards   Normal Lung volumes   Decreased diffusion capacity.  This is not corrected for hemoglobin.   Flow volume loop is consistent with restriction  6MW results per above.  No supplemental oxygen required.  Patient only able to ambulate for 50 seconds.    CT of chest in April of 2024 showed interstitial/fibrotic changes of the lungs     She is currently on no inhalers.

## 2025-03-12 NOTE — ASSESSMENT & PLAN NOTE
Nodular liver on CT of A/P done in ED on 10/30/24  Associated esophageal varices and portal hypertension  patient saw GI at [facility] in consultation in December for her constipation and cirrhosis. Referral was placed to hepatology and has appt on 3/24/25. GI also ordered some additional labs for her workup. These have not been completed.

## 2025-03-12 NOTE — TELEPHONE ENCOUNTER
Called and spoke to pt, she noted that she has not had lab work completed and is unable to have them completed prior to appt tomorrow. Encouraged pt to still come in for her AWV and will discuss Prolia injection at appt.      ----- Message from Lashonda Flores DO sent at 3/12/2025  9:52 AM EDT -----  Paitnet scheduled for AMWV tomorrow. I think she may also be due for her prolia.   Has outstanding lab orders for her TSH and needs cmp to check calcium prior to receiving this injection  She has an outstanding cmp order amongst many other labs ordered by GI at their visit in December.   Please call her to remind her to get labs done. Thanks.

## 2025-03-12 NOTE — PROGRESS NOTES
Patient is an 79 year old female with chronic medical problems as noted below who is being seen today for her annual medicare wellness exam and f/u on her chronic problems.   Patient Active Problem List   Diagnosis    Pulmonary fibrosis (HCC)--patient continues to follow with pulmonology at [facility].     Type 2 diabetes mellitus with chronic kidney disease, without long-term current use of insulin (HCC)--she is currently on no medication for her diabetes. Her last A1c was good. Due for repeat labs.   Lab Results   Component Value Date    HGBA1C 5.7 09/12/2024         History of DVT of lower extremity    ROHIT positive    Anticoagulated    Diverticulosis    History of pulmonary embolism    Hypercholesterolemia    Interstitial lung disease (HCC)    Obesity    Current moderate episode of major depressive disorder without prior episode ([facility])    Osteoarthritis of both knees    Low back pain potentially associated with radiculopathy    Chronic respiratory failure with hypoxia ([facility])    Hypothyroidism--TSH was suppressed on labs done in August. Results reviewed with her at  in September and repeat labs ordered but have not been completed.   Lab Results   Component Value Date    TSH 0.215 (L) 08/20/2024     She reports some skin dryness and itching.     CKD (chronic kidney disease) stage 3, GFR 30-59 ml/min ([facility])    Transaminasemia    Urinary incontinence--still with issues with her urinary incontinence. She is not on any meds for this.   As a result of this, is getting a lot of redness and irritation in perineal area. Requests referral to see gyn for this.     Ambulatory dysfunction    Age-related osteoporosis without current pathological fracture    Fatty liver    Atherosclerosis of aorta (HCC)    Coronary atherosclerosis    SOB (shortness of breath)    Hyperparathyroidism (HCC)    Cirrhosis of liver without ascites (HCC)--patient saw GI at [facility] in consultation in December for her constipation and cirrhosis. Referral  was placed to hepatology and has appt on 3/24/25. GI also ordered some additional labs for her workup. These have not been completed.

## 2025-03-13 ENCOUNTER — OFFICE VISIT (OUTPATIENT)
Dept: FAMILY MEDICINE CLINIC | Facility: CLINIC | Age: 80
End: 2025-03-13
Payer: COMMERCIAL

## 2025-03-13 VITALS
OXYGEN SATURATION: 94 % | DIASTOLIC BLOOD PRESSURE: 58 MMHG | HEART RATE: 73 BPM | BODY MASS INDEX: 27.25 KG/M2 | WEIGHT: 149 LBS | SYSTOLIC BLOOD PRESSURE: 138 MMHG

## 2025-03-13 DIAGNOSIS — K74.60 CIRRHOSIS OF LIVER WITHOUT ASCITES, UNSPECIFIED HEPATIC CIRRHOSIS TYPE (HCC): ICD-10-CM

## 2025-03-13 DIAGNOSIS — J84.10 PULMONARY FIBROSIS (HCC): ICD-10-CM

## 2025-03-13 DIAGNOSIS — Z00.00 MEDICARE ANNUAL WELLNESS VISIT, SUBSEQUENT: Primary | ICD-10-CM

## 2025-03-13 DIAGNOSIS — I25.10 ATHEROSCLEROSIS OF CORONARY ARTERY OF NATIVE HEART WITHOUT ANGINA PECTORIS, UNSPECIFIED VESSEL OR LESION TYPE: ICD-10-CM

## 2025-03-13 DIAGNOSIS — F32.1 CURRENT MODERATE EPISODE OF MAJOR DEPRESSIVE DISORDER WITHOUT PRIOR EPISODE (HCC): ICD-10-CM

## 2025-03-13 DIAGNOSIS — N90.89 VULVAR IRRITATION: ICD-10-CM

## 2025-03-13 DIAGNOSIS — R63.4 WEIGHT LOSS: ICD-10-CM

## 2025-03-13 DIAGNOSIS — J96.11 CHRONIC RESPIRATORY FAILURE WITH HYPOXIA (HCC): ICD-10-CM

## 2025-03-13 DIAGNOSIS — Z86.711 HISTORY OF PULMONARY EMBOLISM: ICD-10-CM

## 2025-03-13 DIAGNOSIS — E78.00 HYPERCHOLESTEROLEMIA: ICD-10-CM

## 2025-03-13 DIAGNOSIS — N18.30 STAGE 3 CHRONIC KIDNEY DISEASE, UNSPECIFIED WHETHER STAGE 3A OR 3B CKD (HCC): ICD-10-CM

## 2025-03-13 DIAGNOSIS — M81.0 AGE-RELATED OSTEOPOROSIS WITHOUT CURRENT PATHOLOGICAL FRACTURE: ICD-10-CM

## 2025-03-13 DIAGNOSIS — E11.22 TYPE 2 DIABETES MELLITUS WITH STAGE 3A CHRONIC KIDNEY DISEASE, WITHOUT LONG-TERM CURRENT USE OF INSULIN (HCC): ICD-10-CM

## 2025-03-13 DIAGNOSIS — Z86.718 HISTORY OF DVT OF LOWER EXTREMITY: ICD-10-CM

## 2025-03-13 DIAGNOSIS — N18.31 TYPE 2 DIABETES MELLITUS WITH STAGE 3A CHRONIC KIDNEY DISEASE, WITHOUT LONG-TERM CURRENT USE OF INSULIN (HCC): ICD-10-CM

## 2025-03-13 PROCEDURE — 99214 OFFICE O/P EST MOD 30 MIN: CPT | Performed by: FAMILY MEDICINE

## 2025-03-13 PROCEDURE — G0439 PPPS, SUBSEQ VISIT: HCPCS | Performed by: FAMILY MEDICINE

## 2025-03-13 RX ORDER — ATORVASTATIN CALCIUM 20 MG/1
20 TABLET, FILM COATED ORAL DAILY
Qty: 90 TABLET | Refills: 1 | Status: SHIPPED | OUTPATIENT
Start: 2025-03-13

## 2025-03-13 RX ORDER — FLUCONAZOLE 150 MG/1
TABLET ORAL
Qty: 2 TABLET | Refills: 0 | Status: SHIPPED | OUTPATIENT
Start: 2025-03-13 | End: 2025-03-18

## 2025-03-13 NOTE — ASSESSMENT & PLAN NOTE
Seen on CT scan of chest done 10/30/23  Coronary calcification on CT abdomen and pelvis 10/30/24 (Moderate coronary calcification in the left anterior descending, left circumflex and right coronary artery  Patient being prescribed atorvastatin but not taking. Rx sent and advised that she resume taking.     Orders:    atorvastatin (LIPITOR) 20 mg tablet; Take 1 tablet (20 mg total) by mouth daily

## 2025-03-13 NOTE — ASSESSMENT & PLAN NOTE
Lab Results   Component Value Date    EGFR 49 10/30/2024    EGFR 45 (L) 08/20/2024    EGFR 57 (L) 04/12/2024    CREATININE 1.07 10/30/2024    CREATININE 1.21 (H) 08/20/2024    CREATININE 1.01 (H) 04/12/2024

## 2025-03-13 NOTE — ASSESSMENT & PLAN NOTE
Lab Results   Component Value Date    EGFR 49 10/30/2024    EGFR 45 (L) 08/20/2024    EGFR 57 (L) 04/12/2024    CREATININE 1.07 10/30/2024    CREATININE 1.21 (H) 08/20/2024    CREATININE 1.01 (H) 04/12/2024   stable

## 2025-03-13 NOTE — ASSESSMENT & PLAN NOTE
Dexa scan 1/17/24 t-score lumbar spine -0.7; t-score total hip -1.9; t-score femoral neck -2.8  Prolia 9/12/24  Due for prolia today but she cannot get because she did not get as she is due for cmp to check her calcium.

## 2025-03-13 NOTE — ASSESSMENT & PLAN NOTE
Has rx for atorvastatin but is not taking.   New rx sent today.     Orders:    atorvastatin (LIPITOR) 20 mg tablet; Take 1 tablet (20 mg total) by mouth daily

## 2025-03-13 NOTE — PROGRESS NOTES
Name: Kenzie Cali      : 1945      MRN: [id number]  Encounter Provider: Lashonda Flores DO  Encounter Date: 3/13/2025   Encounter department: [facility] PRIMARY CARE    Assessment & Plan  Medicare annual wellness visit, subsequent         Pulmonary fibrosis (HCC)  Patient following with pulmonology, Dr. Moralez at [facility].   Had PFT's done 23 and showed:  No obstructive airflow defect    Spirometry suggests restriction    No significant response to the administration of bronchodilator per ATS Standards   Normal Lung volumes   Decreased diffusion capacity.  This is not corrected for hemoglobin.   Flow volume loop is consistent with restriction  6MW results per above.  No supplemental oxygen required.  Patient only able to ambulate for 50 seconds.    CT of chest in 2024 showed interstitial/fibrotic changes of the lungs     She is currently on no inhalers.          Cirrhosis of liver without ascites, unspecified hepatic cirrhosis type (HCC)  Nodular liver on CT of A/P done in ED on 10/30/24  Associated esophageal varices and portal hypertension  patient saw GI at [facility] in consultation in December for her constipation and cirrhosis. Referral was placed to hepatology and has appt on 3/24/25. GI also ordered some additional labs for her workup. These have not been completed.          Weight loss  14 lb weight loss in last 3 months.   Her thyroid was overactive on last labs in September. She never went for repeat labs. Needs to have this done to make sure that thyroid not contributing to this.   This will need to be monitored.        Vulvar irritation  No improvement with the topical nystatin   Has significant erythema and irritation of perineal region for which I gave her rx for diflucan today though this will be difficult to clear up in that she is incontinent and has soiled diaper   Wants referral to gyn.   Orders:  •  Ambulatory Referral to Obstetrics / Gynecology;  Future  •  fluconazole (DIFLUCAN) 150 mg tablet; May repeat in 3 days if no improvement    History of DVT of lower extremity  Patient on eliquis for this and was on this medication when we first met back in December of 2023. She is not sure which leg she had DVT and is not sure if she ever had hypercoagulable workup.   Unfortunately I have never received any of her previous records to know if long term anticoagulation was recommended. No idea when she even had this DVT.   I will put referral in for her to see the hematologist to determine  need for the eliquis.            Current moderate episode of major depressive disorder without prior episode (HCC)  On cymbalta 30. Denies feeling depressed.          Stage 3 chronic kidney disease, unspecified whether stage 3a or 3b CKD (HCC)  Lab Results   Component Value Date    EGFR 49 10/30/2024    EGFR 45 (L) 08/20/2024    EGFR 57 (L) 04/12/2024    CREATININE 1.07 10/30/2024    CREATININE 1.21 (H) 08/20/2024    CREATININE 1.01 (H) 04/12/2024            Atherosclerosis of coronary artery of native heart without angina pectoris, unspecified vessel or lesion type  Seen on CT scan of chest done 10/30/23  Coronary calcification on CT abdomen and pelvis 10/30/24 (Moderate coronary calcification in the left anterior descending, left circumflex and right coronary artery  Patient being prescribed atorvastatin but not taking. Rx sent and advised that she resume taking.     Orders:  •  atorvastatin (LIPITOR) 20 mg tablet; Take 1 tablet (20 mg total) by mouth daily    Hypercholesterolemia  Has rx for atorvastatin but is not taking.   New rx sent today.     Orders:  •  atorvastatin (LIPITOR) 20 mg tablet; Take 1 tablet (20 mg total) by mouth daily    Age-related osteoporosis without current pathological fracture  Dexa scan 1/17/24 t-score lumbar spine -0.7; t-score total hip -1.9; t-score femoral neck -2.8  Prolia 9/12/24  Due for prolia today but she cannot get because she did not get  as she is due for cmp to check her calcium.   Chronic respiratory failure with hypoxia (HCC)  Sees pulmonology.        Type 2 diabetes mellitus with stage 3a chronic kidney disease, without long-term current use of insulin (HCC)    Lab Results   Component Value Date    HGBA1C 5.7 09/12/2024     On no meds for this.           Preventive health issues were discussed with patient, and age appropriate screening tests were ordered as noted in patient's After Visit Summary. Personalized health advice and appropriate referrals for health education or preventive services given if needed, as noted in patient's After Visit Summary.    History of Present Illness     HPI     Patient is an 79 year old female with chronic medical problems as noted below who is being seen today for her annual medicare wellness exam and f/u on her chronic problems.     Patient Active Problem List   Diagnosis   • Pulmonary fibrosis (HCC)--patient continues to follow with pulmonology at [facility]. Had PFT's in December. She feels that breathing is about the same.    • Type 2 diabetes mellitus with chronic kidney disease, without long-term current use of insulin (HCC)--she is currently on no medication for her diabetes. Her last A1c was good. Due for repeat labs.   Lab Results   Component Value Date    HGBA1C 5.7 09/12/2024        • History of DVT of lower extremity--Patient on eliquis for this and was on this medication when we first met back in December of 2023. She is not sure which leg she had DVT, when it was and is not sure if she ever had hypercoagulable workup.   I never received records from previous physician.    • ROHIT positive   • Anticoagulated   • Diverticulosis   • History of pulmonary embolism   • Hypercholesterolemia--being prescribed atorvatatin but not taking. she is overdue for lipid panel and cmp. Has order.    • Interstitial lung disease (HCC)   • Obesity   • Current moderate episode of major depressive disorder without prior  episode (HCC)--moods are stable on the cymbalta   • Osteoarthritis of both knees   • Low back pain potentially associated with radiculopathy   • Chronic respiratory failure with hypoxia (HCC)   • Hypothyroidism--TSH was suppressed on labs done in August. Results reviewed with her at  in September and repeat labs ordered but have not been completed.   Lab Results   Component Value Date    TSH 0.215 (L) 08/20/2024     She reports some skin dryness and itching.    • CKD (chronic kidney disease) stage 3, GFR 30-59 ml/min (HCC)   • Transaminasemia   • Urinary incontinence--still with issues with her urinary incontinence. She is not on any meds for this.   As a result of this, is getting a lot of redness and irritation in perineal area. She tried using the nystatin cream. Requests referral to see gyn for this.    • Ambulatory dysfunction   • Age-related osteoporosis without current pathological fracture   • Fatty liver   • Atherosclerosis of aorta (HCC)   • Coronary atherosclerosis   • SOB (shortness of breath)   • Hyperparathyroidism (HCC)   • Cirrhosis of liver without ascites (HCC)--patient saw GI at [facility] in consultation in December for her constipation and cirrhosis. Referral was placed to hepatology and has appt on 3/24/25. GI also ordered some additional labs for her workup. These have not been completed.      Patient Care Team:  Lashonda Flores DO as PCP - General (Family Medicine)  Lashonda Flores DO as PCP - PCP-[facility] ([facility])  Molly Olson PA-C as Physician Assistant (Gastroenterology)  Goldie Mendez PA-C as Physician Assistant (Gastroenterology)    Review of Systems  Medical History Reviewed by provider this encounter:  Tobacco  Allergies  Meds  Problems  Med Hx  Surg Hx  Fam Hx  Soc   Hx      Annual Wellness Visit Questionnaire   Kenzie is here for her Subsequent Wellness visit.     Health Risk Assessment:   Patient rates overall health as good. Patient feels that their physical  health rating is slightly worse. Patient is satisfied with their life. Eyesight was rated as slightly better. Hearing was rated as same. Patient feels that their emotional and mental health rating is same. Patients states they are never, rarely angry. Patient states they are often unusually tired/fatigued. Pain experienced in the last 7 days has been a lot. Patient's pain rating has been 7/10. Patient states that she has experienced weight loss or gain in last 6 months.     Depression Screening:   PHQ-9 Score: 2      Fall Risk Screening:   In the past year, patient has experienced: no history of falling in past year      Urinary Incontinence Screening:   Patient has leaked urine accidently in the last six months.     Home Safety:  Patient does not have trouble with stairs inside or outside of their home. Patient has working smoke alarms and has working carbon monoxide detector. Home safety hazards include: none.     Nutrition:   Current diet is Regular.     Medications:   Patient is currently taking over-the-counter supplements. OTC medications include: see medication list. Patient is able to manage medications.     Activities of Daily Living (ADLs)/Instrumental Activities of Daily Living (IADLs):   Walk and transfer into and out of bed and chair?: Yes  Dress and groom yourself?: Yes    Bathe or shower yourself?: Yes    Feed yourself? Yes  Do your laundry/housekeeping?: Yes  Manage your money, pay your bills and track your expenses?: Yes  Make your own meals?: No    Do your own shopping?: Yes    Previous Hospitalizations:   Any hospitalizations or ED visits within the last 12 months?: No      Advance Care Planning:   Living will: Yes    Durable POA for healthcare: No    Advanced directive: Yes      PREVENTIVE SCREENINGS      Cardiovascular Screening:    General: Screening Not Indicated and History Lipid Disorder      Diabetes Screening:     General: Screening Not Indicated and History Diabetes      Colorectal Cancer  Screening:     General: Screening Not Indicated      Breast Cancer Screening:     General: Patient Declines      Cervical Cancer Screening:    General: Screening Not Indicated      Osteoporosis Screening:    General: Screening Not Indicated and History Osteoporosis      Abdominal Aortic Aneurysm (AAA) Screening:        General: Screening Not Indicated      Lung Cancer Screening:     General: Screening Not Indicated      Hepatitis C Screening:    General: Patient Declines    Screening, Brief Intervention, and Referral to Treatment (SBIRT)     Screening      AUDIT-C Screenin) How often did you have a drink containing alcohol in the past year? never  2) How many drinks did you have on a typical day when you were drinking in the past year? 0  3) How often did you have 6 or more drinks on one occasion in the past year? never    AUDIT-C Score: 0  Interpretation: Score 0-2 (female): Negative screen for alcohol misuse    Single Item Drug Screening:  How often have you used an illegal drug (including marijuana) or a prescription medication for non-medical reasons in the past year? never    Single Item Drug Screen Score: 0  Interpretation: Negative screen for possible drug use disorder    Brief Intervention  Alcohol & drug use screenings were reviewed. No concerns regarding substance use disorder identified.     Other Counseling Topics:   Regular weightbearing exercise and calcium and vitamin D intake.     Social Drivers of Health     Food Insecurity: No Food Insecurity (3/13/2025)    Hunger Vital Sign    • Worried About Running Out of Food in the Last Year: Never true    • Ran Out of Food in the Last Year: Never true   Transportation Needs: No Transportation Needs (3/13/2025)    PRAPARE - Transportation    • Lack of Transportation (Medical): No    • Lack of Transportation (Non-Medical): No   Housing Stability: Low Risk  (3/13/2025)    Housing Stability Vital Sign    • Unable to Pay for Housing in the Last Year: No    •  Number of Times Moved in the Last Year: 0    • Homeless in the Last Year: No   Utilities: Not At Risk (3/13/2025)    [facility] Utilities    • Threatened with loss of utilities: No     No results found.    Objective   /58   Pulse 73   Wt 67.6 kg (149 lb)   SpO2 94%   BMI 27.25 kg/m²     Physical Exam  Vitals and nursing note reviewed.   Constitutional:       General: She is not in acute distress.     Appearance: Normal appearance. She is obese. She is not ill-appearing, toxic-appearing or diaphoretic.      Comments: Ambulates with walker   HENT:      Head: Normocephalic and atraumatic.      Right Ear: Tympanic membrane normal.      Left Ear: Tympanic membrane normal.      Nose: Nose normal.   Eyes:      Conjunctiva/sclera: Conjunctivae normal.   Neck:      Vascular: No carotid bruit.   Cardiovascular:      Rate and Rhythm: Normal rate and regular rhythm.      Heart sounds: No murmur heard.  Pulmonary:      Comments: Increased work of breathing. Dry crackles throughout  Abdominal:      General: Abdomen is flat. Bowel sounds are normal.      Palpations: Abdomen is soft.   Genitourinary:     Comments: Has erythema of perineal area, inner thighs.   Wearing depends and there is some stool in diaper and in perianal area  Musculoskeletal:      Cervical back: Normal range of motion.      Right lower leg: No edema.      Left lower leg: No edema.   Skin:     Findings: Rash present.   Neurological:      General: No focal deficit present.      Mental Status: She is alert.   Psychiatric:         Mood and Affect: Mood normal.

## 2025-03-13 NOTE — PATIENT INSTRUCTIONS
Medicare Preventive Visit Patient Instructions  Thank you for completing your Welcome to Medicare Visit or Medicare Annual Wellness Visit today. Your next wellness visit will be due in one year (3/14/2026).  The screening/preventive services that you may require over the next 5-10 years are detailed below. Some tests may not apply to you based off risk factors and/or age. Screening tests ordered at today's visit but not completed yet may show as past due. Also, please note that scanned in results may not display below.  Preventive Screenings:  Service Recommendations Previous Testing/Comments   Colorectal Cancer Screening  * Colonoscopy    * Fecal Occult Blood Test (FOBT)/Fecal Immunochemical Test (FIT)  * Fecal DNA/Cologuard Test  * Flexible Sigmoidoscopy Age: 45-75 years old   Colonoscopy: every 10 years (may be performed more frequently if at higher risk)  OR  FOBT/FIT: every 1 year  OR  Cologuard: every 3 years  OR  Sigmoidoscopy: every 5 years  Screening may be recommended earlier than age 45 if at higher risk for colorectal cancer. Also, an individualized decision between you and your healthcare provider will decide whether screening between the ages of 76-85 would be appropriate. Colonoscopy: Not on file  FOBT/FIT: Not on file  Cologuard: Not on file  Sigmoidoscopy: Not on file          Breast Cancer Screening Age: 40+ years old  Frequency: every 1-2 years  Not required if history of left and right mastectomy Mammogram: Not on file        Cervical Cancer Screening Between the ages of 21-29, pap smear recommended once every 3 years.   Between the ages of 30-65, can perform pap smear with HPV co-testing every 5 years.   Recommendations may differ for women with a history of total hysterectomy, cervical cancer, or abnormal pap smears in past. Pap Smear: Not on file        Hepatitis C Screening Once for adults born between 1945 and 1965  More frequently in patients at high risk for Hepatitis C Hep C Antibody: Not  on file        Diabetes Screening 1-2 times per year if you're at risk for diabetes or have pre-diabetes Fasting glucose: No results in last 5 years (No results in last 5 years)  A1C: 5.7 (9/12/2024)      Cholesterol Screening Once every 5 years if you don't have a lipid disorder. May order more often based on risk factors. Lipid panel: 08/20/2024          Other Preventive Screenings Covered by Medicare:  Abdominal Aortic Aneurysm (AAA) Screening: covered once if your at risk. You're considered to be at risk if you have a family history of AAA.  Lung Cancer Screening: covers low dose CT scan once per year if you meet all of the following conditions: (1) Age 55-77; (2) No signs or symptoms of lung cancer; (3) Current smoker or have quit smoking within the last 15 years; (4) You have a tobacco smoking history of at least 20 pack years (packs per day multiplied by number of years you smoked); (5) You get a written order from a healthcare provider.  Glaucoma Screening: covered annually if you're considered high risk: (1) You have diabetes OR (2) Family history of glaucoma OR (3)  aged 50 and older OR (4)  American aged 65 and older  Osteoporosis Screening: covered every 2 years if you meet one of the following conditions: (1) You're estrogen deficient and at risk for osteoporosis based off medical history and other findings; (2) Have a vertebral abnormality; (3) On glucocorticoid therapy for more than 3 months; (4) Have primary hyperparathyroidism; (5) On osteoporosis medications and need to assess response to drug therapy.   Last bone density test (DXA Scan): 01/17/2024.  HIV Screening: covered annually if you're between the age of 15-65. Also covered annually if you are younger than 15 and older than 65 with risk factors for HIV infection. For pregnant patients, it is covered up to 3 times per pregnancy.    Immunizations:  Immunization Recommendations   Influenza Vaccine Annual influenza  vaccination during flu season is recommended for all persons aged >= 6 months who do not have contraindications   Pneumococcal Vaccine   * Pneumococcal conjugate vaccine = PCV13 (Prevnar 13), PCV15 (Vaxneuvance), PCV20 (Prevnar 20)  * Pneumococcal polysaccharide vaccine = PPSV23 (Pneumovax) Adults 19-65 yo with certain risk factors or if 65+ yo  If never received any pneumonia vaccine: recommend Prevnar 20 (PCV20)  Give PCV20 if previously received 1 dose of PCV13 or PPSV23   Hepatitis B Vaccine 3 dose series if at intermediate or high risk (ex: diabetes, end stage renal disease, liver disease)   Respiratory syncytial virus (RSV) Vaccine - COVERED BY MEDICARE PART D  * RSVPreF3 (Arexvy) CDC recommends that adults 60 years of age and older may receive a single dose of RSV vaccine using shared clinical decision-making (SCDM)   Tetanus (Td) Vaccine - COST NOT COVERED BY MEDICARE PART B Following completion of primary series, a booster dose should be given every 10 years to maintain immunity against tetanus. Td may also be given as tetanus wound prophylaxis.   Tdap Vaccine - COST NOT COVERED BY MEDICARE PART B Recommended at least once for all adults. For pregnant patients, recommended with each pregnancy.   Shingles Vaccine (Shingrix) - COST NOT COVERED BY MEDICARE PART B  2 shot series recommended in those 19 years and older who have or will have weakened immune systems or those 50 years and older     Health Maintenance Due:  There are no preventive care reminders to display for this patient.  Immunizations Due:      Topic Date Due   • Hepatitis A Vaccine (1 of 2 - Risk 2-dose series) Never done   • Hepatitis B Vaccine (1 of 3 - Risk 3-dose series) Never done   • COVID-19 Vaccine (3 - Pfizer risk series) 05/04/2021   • Influenza Vaccine (1) 09/01/2024     Advance Directives   What are advance directives?  Advance directives are legal documents that state your wishes and plans for medical care. These plans are made  ahead of time in case you lose your ability to make decisions for yourself. Advance directives can apply to any medical decision, such as the treatments you want, and if you want to donate organs.   What are the types of advance directives?  There are many types of advance directives, and each state has rules about how to use them. You may choose a combination of any of the following:  Living will:  This is a written record of the treatment you want. You can also choose which treatments you do not want, which to limit, and which to stop at a certain time. This includes surgery, medicine, IV fluid, and tube feedings.   Durable power of  for healthcare (DPAHC):  This is a written record that states who you want to make healthcare choices for you when you are unable to make them for yourself. This person, called a proxy, is usually a family member or a friend. You may choose more than 1 proxy.  Do not resuscitate (DNR) order:  A DNR order is used in case your heart stops beating or you stop breathing. It is a request not to have certain forms of treatment, such as CPR. A DNR order may be included in other types of advance directives.  Medical directive:  This covers the care that you want if you are in a coma, near death, or unable to make decisions for yourself. You can list the treatments you want for each condition. Treatment may include pain medicine, surgery, blood transfusions, dialysis, IV or tube feedings, and a ventilator (breathing machine).  Values history:  This document has questions about your views, beliefs, and how you feel and think about life. This information can help others choose the care that you would choose.  Why are advance directives important?  An advance directive helps you control your care. Although spoken wishes may be used, it is better to have your wishes written down. Spoken wishes can be misunderstood, or not followed. Treatments may be given even if you do not want them. An  advance directive may make it easier for your family to make difficult choices about your care.   Weight Management   Why it is important to manage your weight:  Being overweight increases your risk of health conditions such as heart disease, high blood pressure, type 2 diabetes, and certain types of cancer. It can also increase your risk for osteoarthritis, sleep apnea, and other respiratory problems. Aim for a slow, steady weight loss. Even a small amount of weight loss can lower your risk of health problems.  How to lose weight safely:  A safe and healthy way to lose weight is to eat fewer calories and get regular exercise. You can lose up about 1 pound a week by decreasing the number of calories you eat by 500 calories each day.   Healthy meal plan for weight management:  A healthy meal plan includes a variety of foods, contains fewer calories, and helps you stay healthy. A healthy meal plan includes the following:  Eat whole-grain foods more often.  A healthy meal plan should contain fiber. Fiber is the part of grains, fruits, and vegetables that is not broken down by your body. Whole-grain foods are healthy and provide extra fiber in your diet. Some examples of whole-grain foods are whole-wheat breads and pastas, oatmeal, brown rice, and bulgur.  Eat a variety of vegetables every day.  Include dark, leafy greens such as spinach, kale, lance greens, and mustard greens. Eat yellow and orange vegetables such as carrots, sweet potatoes, and winter squash.   Eat a variety of fruits every day.  Choose fresh or canned fruit (canned in its own juice or light syrup) instead of juice. Fruit juice has very little or no fiber.  Eat low-fat dairy foods.  Drink fat-free (skim) milk or 1% milk. Eat fat-free yogurt and low-fat cottage cheese. Try low-fat cheeses such as mozzarella and other reduced-fat cheeses.  Choose meat and other protein foods that are low in fat.  Choose beans or other legumes such as split peas or  lentils. Choose fish, skinless poultry (chicken or turkey), or lean cuts of red meat (beef or pork). Before you cook meat or poultry, cut off any visible fat.   Use less fat and oil.  Try baking foods instead of frying them. Add less fat, such as margarine, sour cream, regular salad dressing and mayonnaise to foods. Eat fewer high-fat foods. Some examples of high-fat foods include french fries, doughnuts, ice cream, and cakes.  Eat fewer sweets.  Limit foods and drinks that are high in sugar. This includes candy, cookies, regular soda, and sweetened drinks.  Exercise:  Exercise at least 30 minutes per day on most days of the week. Some examples of exercise include walking, biking, dancing, and swimming. You can also fit in more physical activity by taking the stairs instead of the elevator or parking farther away from stores. Ask your healthcare provider about the best exercise plan for you.      © Copyright [employer] 2018 Information is for End User's use only and may not be sold, redistributed or otherwise used for commercial purposes. All illustrations and images included in CareNotes® are the copyrighted property of [employer] Inc[employer] or [employer]

## 2025-03-13 NOTE — ASSESSMENT & PLAN NOTE
Patient on eliquis for this and was on this medication when we first met back in December of 2023. She is not sure which leg she had DVT and is not sure if she ever had hypercoagulable workup.   Unfortunately I have never received any of her previous records to know if long term anticoagulation was recommended. No idea when she even had this DVT.   I will put referral in for her to see the hematologist to determine  need for the eliquis.

## 2025-03-17 ENCOUNTER — TELEPHONE (OUTPATIENT)
Age: 80
End: 2025-03-17

## 2025-03-17 NOTE — PROGRESS NOTES
Assessment/Plan:  Vulvitis try to keep area clean and dry  Vitamin A&D or aquaphor ointment or calmoseptine as barrier for wetness  Lotrisone cream twice a day for 2 weeks then decrease once a day for 2 weeks then decrease to twice a week if doing ok   Repeat Diflucan in 3 days as ordered by Dr Flores  Increase fluids  Moisturize skin         Diagnoses and all orders for this visit:    Chronic vulvitis  -     clotrimazole-betamethasone (LOTRISONE) 1-0.05 % cream; Apply topically 2 (two) times a day for 7 days Twice a day for 2 weeks then weekly for 2 weeks maintain twice a week    Vulvar irritation  -     Ambulatory Referral to Obstetrics / Gynecology    Lichen sclerosus et atrophicus  -     clotrimazole-betamethasone (LOTRISONE) 1-0.05 % cream; Apply topically 2 (two) times a day for 7 days Twice a day for 2 weeks then weekly for 2 weeks maintain twice a week          Subjective:      Patient ID: Kenzie Cali is a 79 y.o. female.    New pt here with c/o vaginitis symptoms for a few weeks Incont and soiled diapers Prescribed Nystatin cream by her PCP as well as Diflucan 3/13/2023 and took her first dose yesterday She states it is not helping She is using [employer] ultra healing and feels this has helped the most She denies vaginal discharge Denies bladder sx She has issue with  incont and wears a brief She has been in and out of rehab and SNF the past year and is currently back in her apartment  She has a son who helps her.         The following portions of the patient's history were reviewed and updated as appropriate: allergies, current medications, past family history, past medical history, past social history, past surgical history, and problem list.    Review of Systems   Constitutional:  Negative for chills and fever.   Gastrointestinal:  Negative for abdominal pain, constipation and nausea.   Genitourinary:  Negative for vaginal bleeding, vaginal discharge and vaginal pain.        Vulvar itching       "    Objective:      /62 (BP Location: Left arm, Patient Position: Sitting, Cuff Size: Standard)   Ht 5' 2\" (1.575 m)   Wt 66.2 kg (146 lb)   LMP  (LMP Unknown)   BMI 26.70 kg/m²          Physical Exam  Vitals and nursing note reviewed.   Constitutional:       General: She is not in acute distress.     Appearance: Normal appearance.   HENT:      Head: Normocephalic and atraumatic.   Abdominal:      General: There is no distension.      Palpations: There is no mass.      Tenderness: There is no abdominal tenderness.   Genitourinary:     Exam position: Lithotomy position.      Labia:         Right: No rash or lesion.         Left: No rash or lesion.       Vagina: Erythema present. No vaginal discharge.      Cervix: No cervical motion tenderness, discharge, lesion or cervical bleeding.      Uterus: Normal.       Comments: There is some lichen changes over the clitoral area with fusion of labia Erythema over bilat labia   Lymphadenopathy:      Lower Body: No right inguinal adenopathy. No left inguinal adenopathy.   Skin:     General: Skin is warm and dry.   Neurological:      General: No focal deficit present.      Mental Status: She is alert and oriented to person, place, and time.   Psychiatric:         Mood and Affect: Mood normal.         Behavior: Behavior normal.         Thought Content: Thought content normal.         "

## 2025-03-17 NOTE — TELEPHONE ENCOUNTER
Patient just call to inform the office she just got her lab done today. Please let the doctor know. Thank you

## 2025-03-18 ENCOUNTER — OFFICE VISIT (OUTPATIENT)
Dept: OBGYN CLINIC | Facility: CLINIC | Age: 80
End: 2025-03-18
Payer: COMMERCIAL

## 2025-03-18 ENCOUNTER — RESULTS FOLLOW-UP (OUTPATIENT)
Dept: FAMILY MEDICINE CLINIC | Facility: CLINIC | Age: 80
End: 2025-03-18

## 2025-03-18 ENCOUNTER — CLINICAL SUPPORT (OUTPATIENT)
Dept: FAMILY MEDICINE CLINIC | Facility: CLINIC | Age: 80
End: 2025-03-18
Payer: COMMERCIAL

## 2025-03-18 VITALS
DIASTOLIC BLOOD PRESSURE: 62 MMHG | SYSTOLIC BLOOD PRESSURE: 130 MMHG | WEIGHT: 146 LBS | BODY MASS INDEX: 26.87 KG/M2 | HEIGHT: 62 IN

## 2025-03-18 DIAGNOSIS — N76.3 CHRONIC VULVITIS: Primary | ICD-10-CM

## 2025-03-18 DIAGNOSIS — N90.89 VULVAR IRRITATION: ICD-10-CM

## 2025-03-18 DIAGNOSIS — L90.0 LICHEN SCLEROSUS ET ATROPHICUS: ICD-10-CM

## 2025-03-18 DIAGNOSIS — M81.0 AGE-RELATED OSTEOPOROSIS WITHOUT CURRENT PATHOLOGICAL FRACTURE: Primary | ICD-10-CM

## 2025-03-18 DIAGNOSIS — E03.9 HYPOTHYROIDISM, UNSPECIFIED TYPE: Primary | ICD-10-CM

## 2025-03-18 LAB
ALBUMIN SERPL-MCNC: 3.7 G/DL (ref 3.8–4.8)
ALBUMIN SERPL-MCNC: 3.9 G/DL (ref 3.8–4.8)
ALP SERPL-CCNC: 73 IU/L (ref 44–121)
ALP SERPL-CCNC: 73 IU/L (ref 44–121)
ALT SERPL-CCNC: 22 IU/L (ref 0–32)
ALT SERPL-CCNC: 22 IU/L (ref 0–32)
AST SERPL-CCNC: 39 IU/L (ref 0–40)
AST SERPL-CCNC: 41 IU/L (ref 0–40)
BILIRUB SERPL-MCNC: 0.4 MG/DL (ref 0–1.2)
BILIRUB SERPL-MCNC: 0.4 MG/DL (ref 0–1.2)
BUN SERPL-MCNC: 25 MG/DL (ref 8–27)
BUN SERPL-MCNC: 26 MG/DL (ref 8–27)
BUN/CREAT SERPL: 21 (ref 12–28)
BUN/CREAT SERPL: 22 (ref 12–28)
CALCIUM SERPL-MCNC: 9.7 MG/DL (ref 8.7–10.3)
CALCIUM SERPL-MCNC: 9.8 MG/DL (ref 8.7–10.3)
CHLORIDE SERPL-SCNC: 103 MMOL/L (ref 96–106)
CHLORIDE SERPL-SCNC: 104 MMOL/L (ref 96–106)
CHOLEST SERPL-MCNC: 135 MG/DL (ref 100–199)
CHOLEST SERPL-MCNC: 139 MG/DL (ref 100–199)
CHOLEST/HDLC SERPL: 4.7 RATIO (ref 0–4.4)
CO2 SERPL-SCNC: 19 MMOL/L (ref 20–29)
CO2 SERPL-SCNC: 19 MMOL/L (ref 20–29)
CREAT SERPL-MCNC: 1.16 MG/DL (ref 0.57–1)
CREAT SERPL-MCNC: 1.25 MG/DL (ref 0.57–1)
EGFR: 44 ML/MIN/1.73
EGFR: 48 ML/MIN/1.73
EST. AVERAGE GLUCOSE BLD GHB EST-MCNC: 105 MG/DL
EST. AVERAGE GLUCOSE BLD GHB EST-MCNC: 111 MG/DL
GLOBULIN SER-MCNC: 3.3 G/DL (ref 1.5–4.5)
GLOBULIN SER-MCNC: 3.3 G/DL (ref 1.5–4.5)
GLUCOSE SERPL-MCNC: 100 MG/DL (ref 70–99)
GLUCOSE SERPL-MCNC: 101 MG/DL (ref 70–99)
HBA1C MFR BLD: 5.3 % (ref 4.8–5.6)
HBA1C MFR BLD: 5.5 % (ref 4.8–5.6)
HDLC SERPL-MCNC: 29 MG/DL
HDLC SERPL-MCNC: 29 MG/DL
LDLC SERPL CALC-MCNC: 83 MG/DL (ref 0–99)
LDLC SERPL CALC-MCNC: 88 MG/DL (ref 0–99)
LDLC/HDLC SERPL: 3 RATIO (ref 0–3.2)
POTASSIUM SERPL-SCNC: 3.8 MMOL/L (ref 3.5–5.2)
POTASSIUM SERPL-SCNC: 3.8 MMOL/L (ref 3.5–5.2)
PROT SERPL-MCNC: 7 G/DL (ref 6–8.5)
PROT SERPL-MCNC: 7.2 G/DL (ref 6–8.5)
SL AMB VLDL CHOLESTEROL CALC: 22 MG/DL (ref 5–40)
SL AMB VLDL CHOLESTEROL CALC: 23 MG/DL (ref 5–40)
SODIUM SERPL-SCNC: 139 MMOL/L (ref 134–144)
SODIUM SERPL-SCNC: 141 MMOL/L (ref 134–144)
TRIGL SERPL-MCNC: 120 MG/DL (ref 0–149)
TRIGL SERPL-MCNC: 126 MG/DL (ref 0–149)
TSH SERPL DL<=0.005 MIU/L-ACNC: 0.08 UIU/ML (ref 0.45–4.5)
TSH SERPL DL<=0.005 MIU/L-ACNC: 0.09 UIU/ML (ref 0.45–4.5)
TSH SERPL DL<=0.005 MIU/L-ACNC: 0.09 UIU/ML (ref 0.45–4.5)

## 2025-03-18 PROCEDURE — 99203 OFFICE O/P NEW LOW 30 MIN: CPT | Performed by: NURSE PRACTITIONER

## 2025-03-18 RX ORDER — LEVOTHYROXINE SODIUM 125 UG/1
125 TABLET ORAL
Qty: 100 TABLET | Refills: 3 | Status: SHIPPED | OUTPATIENT
Start: 2025-03-18

## 2025-03-18 RX ORDER — CLOTRIMAZOLE AND BETAMETHASONE DIPROPIONATE 10; .64 MG/G; MG/G
CREAM TOPICAL 2 TIMES DAILY
Qty: 45 G | Refills: 3 | Status: SHIPPED | OUTPATIENT
Start: 2025-03-18 | End: 2025-03-25

## 2025-03-18 NOTE — PATIENT INSTRUCTIONS
Vulvitis try to keep area clean and dry  Vitamin A&D or aquaphor ointment or calmoseptine as barrier for wetness  Lotrisone cream twice a day for 2 weeks then decrease to once a day for 2 weeks then to twice a week if doing ok   Repeat Diflucan in 3 days as ordered by Dr Flores  Increase fluids  Moisturize skin

## 2025-03-18 NOTE — TELEPHONE ENCOUNTER
"Called pt, relayed Dr. Flores's message. Pt is going to come in today around 1pm for her prolia shot. Pt noted that she stopped taking all medication 3 days prior to her lab work to get \"a true look of where my blood levels are at\". Provider made aware.    ----- Message from Lashonda Flores DO sent at 3/18/2025  8:16 AM EDT -----  Call patient to let her know that labs showed that thyroid overactive. Need to decrease dose of her levothyroxine from 137 mcg daily to 125 mcg daily  Will need recheck of this in 6 weeks.   Her calcium level was normal  so can schedule prolia shot at her convenience  Kidney function diminished but stable.   Blood sugar was 101 which is just slightly elevated but overall sugar (A1c ) was normal.   "

## 2025-03-19 ENCOUNTER — TELEPHONE (OUTPATIENT)
Age: 80
End: 2025-03-19

## 2025-03-19 NOTE — TELEPHONE ENCOUNTER
"Patient states when she saw provider at office visit yesterday, provider had advised patient use Lotrisone cream BID for 7 days, and then daily for 7 days. However, patient states she is confused about instructions on prescription which state \"Apply topically 2 (two) times a day for 7 days Twice a day for 2 weeks then weekly for 2 weeks maintain twice a week.\"    Patient states these instructions do not match what provider advised. Offered to send provider msg for clarification. Patient requesting phone call from provider to discuss. She is available at any time. Routing to provider for review.  "

## 2025-04-01 ENCOUNTER — OFFICE VISIT (OUTPATIENT)
Dept: GASTROENTEROLOGY | Facility: CLINIC | Age: 80
End: 2025-04-01
Payer: COMMERCIAL

## 2025-04-01 VITALS
BODY MASS INDEX: 25.76 KG/M2 | HEIGHT: 62 IN | WEIGHT: 140 LBS | SYSTOLIC BLOOD PRESSURE: 138 MMHG | DIASTOLIC BLOOD PRESSURE: 80 MMHG

## 2025-04-01 DIAGNOSIS — R93.5 ABNORMAL CT OF THE ABDOMEN: ICD-10-CM

## 2025-04-01 DIAGNOSIS — K74.60 CIRRHOSIS OF LIVER WITHOUT ASCITES, UNSPECIFIED HEPATIC CIRRHOSIS TYPE (HCC): Primary | ICD-10-CM

## 2025-04-01 DIAGNOSIS — K21.9 GASTROESOPHAGEAL REFLUX DISEASE, UNSPECIFIED WHETHER ESOPHAGITIS PRESENT: ICD-10-CM

## 2025-04-01 DIAGNOSIS — R63.4 WEIGHT LOSS, ABNORMAL: ICD-10-CM

## 2025-04-01 DIAGNOSIS — Z79.01 ANTICOAGULATED: ICD-10-CM

## 2025-04-01 DIAGNOSIS — R19.8 IRREGULAR BOWEL HABITS: ICD-10-CM

## 2025-04-01 PROCEDURE — 99214 OFFICE O/P EST MOD 30 MIN: CPT | Performed by: PHYSICIAN ASSISTANT

## 2025-04-01 NOTE — ASSESSMENT & PLAN NOTE
On Eliquis for history of DVT PE.  Would need clearance to hold 2 days prior to endoscopic procedure if pursued in future.      Follow up: 6-8 w with hepatology

## 2025-04-01 NOTE — PROGRESS NOTES
Name: Kenzie Cali      : 1945      MRN: [id number]  Encounter Provider: Molly Olson PA-C  Encounter Date: 2025   Encounter department: [facility]  :  Assessment & Plan  Irregular bowel habits  Was having intermittent diarrhea constipation last office visit.  CT confirmed large stool burden thought to be related to overflow.  Advised to try MiraLAX.  Patient never took.  Fortunately stools are back to baseline.  Offered colonoscopy to evaluate change in bowel habits and exclude underlying malignancy but she declined.  Pt is aware of the risk of missing underlying malignancy and expressed her understanding.       Gastroesophageal reflux disease, unspecified whether esophagitis present  Reports chronic reflux for 5 years controlled with omeprazole 20 mg daily.  Antireflux regiment lifestyle modifications were reviewed.  Offered EGD to evaluate for Garcia's and screen for varices but patient declined.  Is aware of the risk of missing underlying malignancy or GI bleed and expressed her agreement and understanding.     -cont omeprazole  20 mg qd  Weight loss, abnormal  Reports poor appetite and has 23 pound unintentional weight loss in last 3 months.  Thyroid was overactive and levothyroxine recently decreased by PCP.  Offered EGD/COLON to evaluate for GI pathology to account for weight loss but she declined.  Again pt is aware of risks of missing underlying malignancy and expressed her agreement and understanding.  Patient encouraged to eat and would monitor weight.       Cirrhosis of liver without ascites, unspecified hepatic cirrhosis type (HCC)  Pt with fatty liver on ultrasound earlier this year, AST > ALT.  CT shows incidental cirrhosis with probable portal hypertension with mild splenomegaly and distal esophageal varices, nonspecific edema along gastrohepatic ligament.  Pt denies history of liver disease or cirrhosis.  At last visit chronic liver disease labs  ordered and to calculate fib 4/MELD to confirm cirrhosis and evaluate etiology although unlikely candidate for transplant given age and comorbidities. Pt never completed and advised to do so. She wants a definite answer in regard to her cirrhosis so will check electrography to eval for fibrosis. Would like to avoid more invasive liver biopsy as pt also on AC. The pathogenesis of cirrhosis and potential complications including esophageal varices, HCC, ascites, encephalopathy were reviewed with the patient.  She states even at her age and comorbidities she would pursue treatment, yet is declining EGD/COLON.  No evidence of HCC on recent CT scan but will check AFP.  She is at high risk for screening endoscopy given pulmonary fibrosis and chronic respiratory failure with chronic dyspnea while also on Eliquis for history of DVT/PE.  Referral placed to hepatology at last visit but unfortunately canceled twice.  Advised to make appt with hepatology in follow up for evaluation and management.  She is already following low salt diet.  Patient advised to contact our office if she develops signs of abdominal distention lower extremity edema, confusion, GI bleeding, etc.     Health Maintenance:  Patient counseled to avoid alcohol and tobacco products.  Patient was also advised to avoid raw seafood/oysters and from swimming in unchlorinated adhikari, particularly from the [place], due to increased risk of infection from Vibrio Vulnificus.  Patient was also instructed to seek immediate medical attention should he develop fevers over 101 F, evidence of GI bleeding (black or bloody stools, bloody or coffee ground emesis) or confusion.  Patient was advised to avoid NSAIDs, if possible, due to increase risk of renal dysfunction, and advised that if he should use acetaminophen, dose should not exceed 2 grams/day.  Patient was recommend to remain up to date with adult vaccination, including influenza, pneumovax and meningococcus.  Inactivated HSV and zoster vaccine is safe and encouraged by PCP.  Patient was instructed to call either our office or that of his referring doctor should he develop worsening symptoms related to lower extremity edema, ascites, jaundice or excessive bruising/bleeding.     - Ambulatory Referral to Gastroenterology  - CBC; Future  - Comprehensive metabolic panel; Future  - Protime-INR; Future  - HAV antibody w/ rfx; Future  - Hepatitis B surface antibody; Future  - Hepatitis B surface antigen; Future  - Hepatitis B core antibody, total; Future  - HCV Ab w/Rflx to Verification; Future  - AFP tumor marker; Future  - Fe+TIBC+Mike  - Ceruloplasmin; Future  - Alpha-1-Antitrypsin Deficiency; Future  - Antimitochondrial antibody; Future  - Anti-smooth muscle antibody, IgG; Future  - ROHIT Screen w/Reflex Cascade; Future  Orders:    US elastography; Future    Abnormal CT of the abdomen  As above       Anticoagulated  On Eliquis for history of DVT PE.  Would need clearance to hold 2 days prior to endoscopic procedure if pursued in future.      Follow up: 6-8 w with hepatology      History of Present Illness   HPI  Kenzie Cali is a 79 y.o. 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 difficile in 2021 treated with vancomycin being evaluated in follow up for irregular bowel habits and incidental cirrhosis on CT. Denies ever having colonoscopy.  Reports normal endoscopy over 10 years ago.  No family history of GI malignancy or liver disease.  Patient alert and orient x 3 but poor historian at times.     4/2024 celiac panel normal but IgA elevated.  August 2024 TSH low.  October 2024 CBC normal.  CMP showed creatinine 1.07 GFR 49, ALT 37, AST 63.     RUQ US 1/2024 for benji LFT's showed  "fatty liver, status post herminio, neg sylvie dil.      CT A/P with contrast 10/2024 s/p cholecystectomy without biliary dilation, rectal colonic anastomosis from prior rectosigmoid resection, hepatic cirrhosis with probable portal hypertension including mild splenomegaly and distal esophageal varices, mild nonspecific edema along the gastrohepatic ligament, Scattered colonic diverticulosis without evidence of diverticulitis, Probable prior transversely oriented sacral insufficiency fracture.  On personal review large formed stool in colon.     Seen December 2024 was having intermittent diarrhea with eating salads, improved with Imodium, otherwise has hard stools. CT showed constipation, suspected paradoxical diarrhea, advised to start MiraLAX 1-2 times a day.  Also with incidental cirrhosis on CT with probable portal hypertension with mild splenomegaly and distal esophageal varices, nonspecific edema along gastrohepatic ligament.  Pt wanted this worked up. Chronic liver disease labs ordered to calculate MELD and referred to hepatology as patient wanted to pursue treatment.    3/2025 A1c normal.  CMP showed creatinine 1.2 GFR 44 ALT 22, AST 41.  TSH low 0.08.    Patient never completed labs.  Had to appointments with hepatology but canceled for unclear reasons.    Patient reports never tried MiraLAX as was having diarrhea.  Fortunately stools are back to baseline formed every 1 to 2 days without bleeding.  Denies feeling constipation.  Has chronic reflux for 5 years controlled with omeprazole 20 mg daily.  Remains on Eliquis.  Does complain of generalized pruritus but denies jaundice, scleral icterus, acholic stools or hyperchromic urine.  Denies dysphagia vomiting abdominal pain abdominal distention, confusion.  Notes poor appetite and has lost 23 pounds in last 3m.  PCP addressed this and did note thyroid overactive rec lowering levothyroxine dose and repeating labs.       Objective   /80   Ht 5' 2\" (1.575 m)   " Wt 63.5 kg (140 lb)   LMP  (LMP Unknown)   BMI 25.61 kg/m²      Physical Exam  Constitutional: Well-developed, no acute distress  HEENT: normocephalic, mucous membranes moist.  Neck: Supple  Skin: warm and dry  Respiratory: Lungs are clear to auscultation B/L.  Cardiovascular: Heart is regular rate and rhythm.  Gastrointestinal: Soft, nontender, nondistended with normal active bowel sounds.  No masses, guarding, rebound.   Rectal Exam: Deferred.  Extremities: No edema.  Neurologic: Nonfocal. A & O ×3.   Psychiatric: Normal affect.

## 2025-04-01 NOTE — PATIENT INSTRUCTIONS
-labs/elastography  -pt wants to hold off on EGD/COLON  -cont Omeprazole 20 mg qd  -antireflux diet  -6w OV hepatology  
Followed protocol

## 2025-04-01 NOTE — ASSESSMENT & PLAN NOTE
Pt with fatty liver on ultrasound earlier this year, AST > ALT.  CT shows incidental cirrhosis with probable portal hypertension with mild splenomegaly and distal esophageal varices, nonspecific edema along gastrohepatic ligament.  Pt denies history of liver disease or cirrhosis.  At last visit chronic liver disease labs ordered and to calculate fib 4/MELD to confirm cirrhosis and evaluate etiology although unlikely candidate for transplant given age and comorbidities. Pt never completed and advised to do so. She wants a definite answer in regard to her cirrhosis so will check electrography to eval for fibrosis. Would like to avoid more invasive liver biopsy as pt also on AC. The pathogenesis of cirrhosis and potential complications including esophageal varices, HCC, ascites, encephalopathy were reviewed with the patient.  She states even at her age and comorbidities she would pursue treatment, yet is declining EGD/COLON.  No evidence of HCC on recent CT scan but will check AFP.  She is at high risk for screening endoscopy given pulmonary fibrosis and chronic respiratory failure with chronic dyspnea while also on Eliquis for history of DVT/PE.  Referral placed to hepatology at last visit but unfortunately canceled twice.  Advised to make appt with hepatology in follow up for evaluation and management.  She is already following low salt diet.  Patient advised to contact our office if she develops signs of abdominal distention lower extremity edema, confusion, GI bleeding, etc.     Health Maintenance:  Patient counseled to avoid alcohol and tobacco products.  Patient was also advised to avoid raw seafood/oysters and from swimming in unchlorinated adhikari, particularly from the [place], due to increased risk of infection from Vibrio Vulnificus.  Patient was also instructed to seek immediate medical attention should he develop fevers over 101 F, evidence of GI bleeding (black or bloody stools, bloody or coffee  ground emesis) or confusion.  Patient was advised to avoid NSAIDs, if possible, due to increase risk of renal dysfunction, and advised that if he should use acetaminophen, dose should not exceed 2 grams/day.  Patient was recommend to remain up to date with adult vaccination, including influenza, pneumovax and meningococcus. Inactivated HSV and zoster vaccine is safe and encouraged by PCP.  Patient was instructed to call either our office or that of his referring doctor should he develop worsening symptoms related to lower extremity edema, ascites, jaundice or excessive bruising/bleeding.     - Ambulatory Referral to Gastroenterology  - CBC; Future  - Comprehensive metabolic panel; Future  - Protime-INR; Future  - HAV antibody w/ rfx; Future  - Hepatitis B surface antibody; Future  - Hepatitis B surface antigen; Future  - Hepatitis B core antibody, total; Future  - HCV Ab w/Rflx to Verification; Future  - AFP tumor marker; Future  - Fe+TIBC+Mike  - Ceruloplasmin; Future  - Alpha-1-Antitrypsin Deficiency; Future  - Antimitochondrial antibody; Future  - Anti-smooth muscle antibody, IgG; Future  - ROHIT Screen w/Reflex Cascade; Future  Orders:    US elastography; Future

## 2025-04-02 ENCOUNTER — CONSULT (OUTPATIENT)
Age: 80
End: 2025-04-02
Payer: COMMERCIAL

## 2025-04-02 VITALS
SYSTOLIC BLOOD PRESSURE: 146 MMHG | DIASTOLIC BLOOD PRESSURE: 80 MMHG | TEMPERATURE: 97.4 F | BODY MASS INDEX: 25.76 KG/M2 | OXYGEN SATURATION: 96 % | RESPIRATION RATE: 18 BRPM | HEART RATE: 65 BPM | HEIGHT: 62 IN | WEIGHT: 140 LBS

## 2025-04-02 DIAGNOSIS — Z86.711 HISTORY OF PULMONARY EMBOLISM: ICD-10-CM

## 2025-04-02 DIAGNOSIS — Z86.718 HISTORY OF DVT OF LOWER EXTREMITY: ICD-10-CM

## 2025-04-02 PROCEDURE — 99204 OFFICE O/P NEW MOD 45 MIN: CPT | Performed by: INTERNAL MEDICINE

## 2025-04-04 ENCOUNTER — TELEPHONE (OUTPATIENT)
Age: 80
End: 2025-04-04

## 2025-04-04 NOTE — TELEPHONE ENCOUNTER
Fax sent to [facility] [phone] and [facility] [phone] requesting medical records pertaining to patient's history of DVT/PE.

## 2025-04-05 NOTE — PROGRESS NOTES
Name: Kenzie Cali      : 1945      MRN: [id number]  Encounter Provider: Natalee Wolfe DO  Encounter Date: 2025   Encounter department: [facility]  :  Assessment & Plan  History of DVT of lower extremity    Orders:    Ambulatory Referral to Hematology / Oncology    I will try to get records from [place] or [facility].  I explained to the patient that when you have a DVT a lot of the judgments of how long to continue anticoagulation depend on if it was a provoked or an unprovoked event.  I tried in many ways to help her wrap her head around what point in time in the past 10 years it might have been.  She has literally no recollection of being told she had a DVT and cannot narrow down in any way with when she might have started the Eliquis.  I told her that if she cannot give me accurate information I do not know that my recommendation will be accurate because the clinical history is so important in these decisions.  I would lean on the side of having her stay on the Eliquis indefinitely in case this was an unprovoked event.  She is very sedentary so I think there may still be some risk factors for DVT.  Also if she had a pulmonary embolism in the setting of her pulmonary fibrosis this could be dangerous.  She is not having any bleeding complications and the Eliquis is not excessively expensive for her.  For all of those reasons I would tell her to stay on it unless there is some obvious contraindication.  I will see her on an as-needed basis going forward.    Return if symptoms worsen or fail to improve.    History of Present Illness   Chief Complaint   Patient presents with    Consult     79-year-old female who was referred to me by her PCP with a question of how long she should continue her Eliquis.  The patient is an extremely poor historian.  She has a tendency to fixate on everything revolving around when she got her COVID-vaccine.  I tried to  "elucidate when that was in my best guess might be around May 2021.  In July 2021 she had shortness of breath and was diagnosed with pneumonia and pulmonary fibrosis.  She has had about 50 pound weight loss over the past 2 years.  Her appetite is decreased and she only eats 2 meals a day.  She has children but they know nothing about her medical history.  She has absolutely no idea when she was started on Eliquis.  She is not even sure that she actually had a DVT.  She cannot tell me what hospital any of this might of occurred at.  It is possible that it was either in the [facility] system or the [place] system.  She is on Eliquis 5 mg twice daily and does not have any bleeding problems.  It does not cost her an excessive amount of money.  She lives in a senior living community now.    Pertinent Medical History      04/05/25: Diabetes mellitus  Hypertension  Hypothyroid  Pulmonary fibrosis  Cirrhosis     Review of Systems otherwise neg        Objective   /80 (BP Location: Left arm, Patient Position: Sitting, Cuff Size: Adult)   Pulse 65   Temp (!) 97.4 °F (36.3 °C) (Temporal)   Resp 18   Ht 5' 2\" (1.575 m)   Wt 63.5 kg (140 lb)   LMP  (LMP Unknown)   SpO2 96%   BMI 25.61 kg/m²     Physical Exam    GEN: Alert, awake oriented x3, in no acute distress  HEENT- No pallor, icterus, cyanosis, no oral mucosal lesions,   LAD - no palpable cervical, clavicle, axillary, inguinal LAD  Heart- normal S1 S2, regular rate and rhythm, No murmur, rubs.   Lungs- clear breathing sound bilateral.   Abdomen- soft, Non tender, bowel sounds present  Extremities- No cyanosis, clubbing, edema  Neuro- No focal neurological deficit      Labs: I have reviewed the following labs:  Results for orders placed or performed in visit on 03/12/25   Comprehensive metabolic panel   Result Value Ref Range    Glucose, Random 101 (H) 70 - 99 mg/dL    BUN 26 8 - 27 mg/dL    Creatinine 1.25 (H) 0.57 - 1.00 mg/dL    eGFR 44 (L) >59 mL/min/1.73    " SL AMB BUN/CREATININE RATIO 21 12 - 28    Sodium 139 134 - 144 mmol/L    Potassium 3.8 3.5 - 5.2 mmol/L    Chloride 103 96 - 106 mmol/L    CO2 19 (L) 20 - 29 mmol/L    CALCIUM 9.7 8.7 - 10.3 mg/dL    Protein, Total 7.2 6.0 - 8.5 g/dL    Albumin 3.9 3.8 - 4.8 g/dL    Globulin, Total 3.3 1.5 - 4.5 g/dL    TOTAL BILIRUBIN 0.4 0.0 - 1.2 mg/dL    Alk Phos Isoenzymes 73 44 - 121 IU/L    AST 41 (H) 0 - 40 IU/L    ALT 22 0 - 32 IU/L   Lipid panel   Result Value Ref Range    Cholesterol, Total 135 100 - 199 mg/dL    Triglycerides 126 0 - 149 mg/dL    HDL 29 (L) >39 mg/dL    VLDL Cholesterol Calculated 23 5 - 40 mg/dL    LDL Calculated 83 0 - 99 mg/dL    T. Chol/HDL Ratio 4.7 (H) 0.0 - 4.4 ratio   TSH, 3rd generation with Free T4 reflex   Result Value Ref Range    TSH 0.084 (L) 0.450 - 4.500 uIU/mL   Hemoglobin A1C   Result Value Ref Range    Hemoglobin A1C 5.5 4.8 - 5.6 %    Estimated Average Glucose 111 mg/dL

## 2025-04-21 ENCOUNTER — TELEPHONE (OUTPATIENT)
Dept: OTHER | Facility: OTHER | Age: 80
End: 2025-04-21

## 2025-04-21 ENCOUNTER — TELEPHONE (OUTPATIENT)
Age: 80
End: 2025-04-21

## 2025-04-21 NOTE — TELEPHONE ENCOUNTER
Pt is calling to confirm appt with us on 04/24 , advised pt she doesn't have any appt scheduled with us . Her appt for 04/24 is with [facility] . No further questions .

## 2025-04-21 NOTE — TELEPHONE ENCOUNTER
Patient is calling to cancel procedure.    Date/Time: 04/23/25 at 8 am    Procedure Type: US ELASTOGRAPHY/UGAP    Patient requesting call back to reschedule: YES [] NO [x]    Patient is cancelling the procedure due to transportation problem. Patient was provided the number for central scheduling to reschedule.

## 2025-04-21 NOTE — TELEPHONE ENCOUNTER
Patient is calling regarding cancelling an appointment.    Date/Time: 4-24-25 @ 12:00 pm    Patient was rescheduled: YES [] NO [x]    Patient requesting call back to reschedule: YES [x] NO []

## 2025-05-10 ENCOUNTER — NURSE TRIAGE (OUTPATIENT)
Dept: OTHER | Facility: OTHER | Age: 80
End: 2025-05-10

## 2025-05-10 NOTE — TELEPHONE ENCOUNTER
"FOLLOW UP: none needed at this time.    REASON FOR CONVERSATION: Pain with Swallowing    SYMPTOMS: Patient states she swallowed a pill last night which was stuck and caused heartburn. Patient reporting symptoms to have gotten better and denies feeling of pill being stuck currently.    OTHER: N/A    DISPOSITION: Home Care        Reason for Disposition   Pill stuck in throat or esophagus    Answer Assessment - Initial Assessment Questions  1. DESCRIPTION: \"Tell me more about this problem.\" \"Are you  having trouble swallowing liquids, solids, or both?\" \"Any trouble with swallowing saliva (spit)?\"        Patient can swallow and had drank water. She is unable to tell if the pill is still there or not. She first began with heartburn last night and could feel the pill in her throat more than usual.       2. SEVERITY: \"How bad is the swallowing difficulty?\"  (Scale 1-10; or mild, moderate, severe)        There is no current problem with difficulty swallowing.       3. ONSET: \"When did the swallowing problems begin?\"         Last night when she noticed the pill was stuck and had heartburn.     6. OTHER SYMPTOMS: \"Do you have any other symptoms?\" (e.g., chest pain, difficulty breathing, mouth sores, sore throat, swollen tongue, chest pain)        Pain in back of neck and acid reflux but not as bad as it was. She cannot feel the pill. She denies chest pain.    Protocols used: Swallowing Difficulty-Adult-    "

## 2025-05-10 NOTE — TELEPHONE ENCOUNTER
"Regarding: Pill stuck in throat / able to talk  ----- Message from Maria Alejandra SCHWARTZ sent at 5/10/2025  9:40 AM EDT -----  Patient called and stated \"Need to talk to a nurse or a doctor, I took calcium pill think its stuck in my throat and I think that is very dangerous.. \"  Patient asked me to wait on hold someone was calling her and the call disconnected.    "

## 2025-05-16 ENCOUNTER — DOCUMENTATION (OUTPATIENT)
Dept: ADMINISTRATIVE | Facility: OTHER | Age: 80
End: 2025-05-16

## 2025-05-16 NOTE — PROGRESS NOTES
05/16/25 12:35 PM    Annual Wellness Visit outreach is not required, an AWV was completed at the PCP office.    Thank you.  Angélica Hanks MA  [facility]

## 2025-05-25 DIAGNOSIS — M54.16 LUMBAR RADICULOPATHY: ICD-10-CM

## 2025-05-25 DIAGNOSIS — M54.41 CHRONIC BILATERAL LOW BACK PAIN WITH BILATERAL SCIATICA: ICD-10-CM

## 2025-05-25 DIAGNOSIS — M47.816 LUMBAR SPONDYLOSIS: ICD-10-CM

## 2025-05-25 DIAGNOSIS — M54.42 CHRONIC BILATERAL LOW BACK PAIN WITH BILATERAL SCIATICA: ICD-10-CM

## 2025-05-25 DIAGNOSIS — G89.29 CHRONIC BILATERAL LOW BACK PAIN WITH BILATERAL SCIATICA: ICD-10-CM

## 2025-05-27 DIAGNOSIS — E03.9 HYPOTHYROIDISM, UNSPECIFIED TYPE: ICD-10-CM

## 2025-05-27 DIAGNOSIS — Z86.711 HISTORY OF PULMONARY EMBOLISM: ICD-10-CM

## 2025-05-27 DIAGNOSIS — M47.816 LUMBAR SPONDYLOSIS: ICD-10-CM

## 2025-05-27 DIAGNOSIS — Z86.718 HISTORY OF DVT OF LOWER EXTREMITY: ICD-10-CM

## 2025-05-27 DIAGNOSIS — M54.16 LUMBAR RADICULOPATHY: ICD-10-CM

## 2025-05-27 DIAGNOSIS — G89.29 CHRONIC BILATERAL LOW BACK PAIN WITH BILATERAL SCIATICA: ICD-10-CM

## 2025-05-27 DIAGNOSIS — M54.41 CHRONIC BILATERAL LOW BACK PAIN WITH BILATERAL SCIATICA: ICD-10-CM

## 2025-05-27 DIAGNOSIS — M54.42 CHRONIC BILATERAL LOW BACK PAIN WITH BILATERAL SCIATICA: ICD-10-CM

## 2025-05-27 RX ORDER — DULOXETIN HYDROCHLORIDE 30 MG/1
30 CAPSULE, DELAYED RELEASE ORAL DAILY
Qty: 30 CAPSULE | Refills: 2 | OUTPATIENT
Start: 2025-05-27

## 2025-05-29 RX ORDER — OMEPRAZOLE 20 MG/1
20 CAPSULE, DELAYED RELEASE ORAL AS NEEDED
Qty: 90 CAPSULE | Refills: 1 | Status: SHIPPED | OUTPATIENT
Start: 2025-05-29

## 2025-05-29 RX ORDER — APIXABAN 5 MG/1
5 TABLET, FILM COATED ORAL 2 TIMES DAILY
Qty: 180 TABLET | Refills: 1 | Status: SHIPPED | OUTPATIENT
Start: 2025-05-29

## 2025-05-29 RX ORDER — AMLODIPINE BESYLATE 10 MG/1
10 TABLET ORAL DAILY
Qty: 90 TABLET | Refills: 1 | Status: SHIPPED | OUTPATIENT
Start: 2025-05-29

## 2025-06-05 LAB — TSH SERPL DL<=0.005 MIU/L-ACNC: 0.69 UIU/ML (ref 0.45–4.5)

## 2025-06-13 DIAGNOSIS — I25.10 ATHEROSCLEROSIS OF CORONARY ARTERY OF NATIVE HEART WITHOUT ANGINA PECTORIS, UNSPECIFIED VESSEL OR LESION TYPE: ICD-10-CM

## 2025-06-13 DIAGNOSIS — E78.00 HYPERCHOLESTEROLEMIA: ICD-10-CM

## 2025-06-13 RX ORDER — ATORVASTATIN CALCIUM 20 MG/1
20 TABLET, FILM COATED ORAL DAILY
Qty: 90 TABLET | Refills: 1 | Status: SHIPPED | OUTPATIENT
Start: 2025-06-13

## 2025-06-17 RX ORDER — LEVOTHYROXINE SODIUM 137 UG/1
137 TABLET ORAL
COMMUNITY
Start: 2025-05-27

## 2025-06-17 RX ORDER — ESCITALOPRAM OXALATE 10 MG/1
10 TABLET ORAL EVERY 24 HOURS
COMMUNITY
End: 2025-06-18

## 2025-06-17 NOTE — PROGRESS NOTES
Name: Kenzie Cali      : 1945      MRN: [id number]  Encounter Provider: Lashonda Flores DO  Encounter Date: 2025   Encounter department: [facility] PRIMARY CARE    Assessment & Plan  History of DVT of lower extremity  Patient on eliquis for this and was on this medication when we first met back in 2023. She is not sure which leg she had DVT and is not sure if she ever had hypercoagulable workup.   I will put referral in for her to see the hematologist to determine  need for the eliquis  Saw hematology in consultation 2025. Recommended continuing eliquis indefinitely give that there is no knowledge of whether event was provoked or unprovoked in past. In addition, she is sedentary         Cirrhosis of liver without ascites, unspecified hepatic cirrhosis type (HCC)  Saw GI on 25. GI had ordered additional labs and US elastography which have not been completed.          Hypothyroidism, unspecified type  Lab Results   Component Value Date    TSH 0.691 2025   On levothyroxine 137 mcg daily  Continue same      Orders:    TSH, 3rd generation with Free T4 reflex; Future    Type 2 diabetes mellitus with stage 3a chronic kidney disease, without long-term current use of insulin (HCC)    Lab Results   Component Value Date    HGBA1C 5.5 2025   On metformin.   Check labs in September followed by appt    Orders:    Albumin / creatinine urine ratio; Future    Comprehensive metabolic panel; Future    Hemoglobin A1C; Future    Hypercholesterolemia    Orders:    Lipid Panel with Direct LDL reflex; Future    Interstitial lung disease (HCC)  Patient follows with pulmonology at [facility] for this.   Her last visit with pulmonology was in 2023. Recommendation was for f/u in 6 months.          Age-related osteoporosis without current pathological fracture  Dexa scan 24 t-score lumbar spine -0.7; t-score total hip -1.9; t-score femoral neck -2.8  Prolia  9/12/24. 3/18/25  Due for next prolia injection in September  Get cmp prior to injection         Stage 3 chronic kidney disease, unspecified whether stage 3a or 3b CKD (HCC)  Lab Results   Component Value Date    EGFR 44 (L) 03/17/2025    EGFR 48 (L) 03/17/2025    EGFR 49 10/30/2024    CREATININE 1.25 (H) 03/17/2025    CREATININE 1.16 (H) 03/17/2025    CREATININE 1.07 10/30/2024   stable         Low back pain potentially associated with radiculopathy  She saw pain management 5/28/24 and had MRI Of lumbar spine done on 6/20/24 and this showed  Degenerative changes of the lumbar spine,   Last visit with pain management in January of this year at which time she was started on cymbalta 30         Weight loss  Thankfully weight has stabilized            History of Present Illness     HPI  Patient is an 79 year old female with DM2, hypothyroidism, hyperlipidemia, cirrhosis of liver with elevated liver enzymes, interstitial lung disease, osteoporosis, CKD3, depression, low back pain, ambulatory dysfunction, history of DVT/PE who is being seen today for f/u visit.     Since her last visit with me in march she saw hematology in consultation regarding need for ongoing anticoagulation. Since there was very little history regarding events surrounding her DVT/PE and timing of the events and no knowledge regarding whether provoked or not, it was advised that she continue her anticoagulation with eliquis yo given her sedentary lifestyle    She also saw GI in consultation regarding her reflux, weight loss, constipation and cirrhosis with elevated liver enzymes. She declined having EGD and colonoscopy. GI did order US elastography and additional labs which have not been completed.     Patient will be due for next prolia shot in September.         Review of Systems  Past Medical History[1]  Past Surgical History[2]  Family History[3]  Social History[4]  Medications[5]  No Known Allergies  Immunization History   Administered Date(s)  Administered    COVID-19 PFIZER VACCINE 0.3 ML IM 03/16/2021, 04/06/2021    Influenza, high dose seasonal 0.7 mL 09/23/2021    Pneumococcal Conjugate Vaccine 20-valent (Pcv20), Polysace 10/28/2022    Pneumococcal Polysaccharide PPV23 09/01/2021    Td (adult), adsorbed 07/18/2022     Objective   /58   Pulse 96   Wt 67.3 kg (148 lb 6.4 oz)   LMP  (LMP Unknown)   SpO2 94%   BMI 27.14 kg/m²     Physical Exam  Vitals and nursing note reviewed.   Constitutional:       General: She is not in acute distress.     Appearance: Normal appearance. She is not ill-appearing, toxic-appearing or diaphoretic.   HENT:      Head: Normocephalic and atraumatic.     Cardiovascular:      Rate and Rhythm: Normal rate and regular rhythm.   Pulmonary:      Comments: Increased work of breathing  Some dry crackles  Abdominal:      General: There is no distension.      Palpations: Abdomen is soft. There is no mass.      Tenderness: There is no abdominal tenderness.     Musculoskeletal:      Cervical back: Normal range of motion and neck supple.      Right lower leg: No edema.      Left lower leg: No edema.     Skin:     Findings: No rash.     Neurological:      General: No focal deficit present.      Mental Status: She is alert.     Psychiatric:         Mood and Affect: Mood normal.                [1]   Past Medical History:  Diagnosis Date    Arthritis     Cataract 03/2023    Cirrhosis of liver (HCC)     CKD (chronic kidney disease) stage 3, GFR 30-59 ml/min (HCC)     Deep vein thrombosis (HCC)     Depression     Diabetes mellitus (HCC)     Diverticulitis     Hypertension     Hypothyroid     Low back pain potentially associated with radiculopathy     Osteoarthritis     Osteoporosis     Pneumonia     Pulmonary fibrosis (HCC)    [2]   Past Surgical History:  Procedure Laterality Date    CATARACT EXTRACTION      CHOLECYSTECTOMY      SKIN CANCER EXCISION     [3]   Family History  Problem Relation Name Age of Onset    Breast cancer Mother       Heart disease Father      Colon cancer Neg Hx      Colon polyps Neg Hx     [4]   Social History  Tobacco Use    Smoking status: Never    Smokeless tobacco: Never   Vaping Use    Vaping status: Never Used   Substance and Sexual Activity    Alcohol use: Not Currently    Drug use: Never    Sexual activity: Not Currently     Partners: Male   [5]   Current Outpatient Medications on File Prior to Visit   Medication Sig    amLODIPine (NORVASC) 10 mg tablet TAKE 1 TABLET BY MOUTH EVERY DAY    apixaban (Eliquis) 5 mg TAKE 1 TABLET BY MOUTH TWICE A DAY    atorvastatin (LIPITOR) 20 mg tablet TAKE 1 TABLET BY MOUTH EVERY DAY    denosumab (PROLIA) 60 mg/mL Inject 1 mL (60 mg total) under the skin once for 1 dose    DULoxetine (CYMBALTA) 30 mg delayed release capsule Take 1 capsule (30 mg total) by mouth daily    levothyroxine 137 mcg tablet Take 137 mcg by mouth daily in the early morning    metFORMIN (GLUCOPHAGE) 500 mg tablet Take 500 mg by mouth every 24 hours    omeprazole (PriLOSEC) 20 mg delayed release capsule TAKE 1 CAPSULE (20 MG TOTAL) BY MOUTH AS NEEDED (AS NEEDED)    [DISCONTINUED] clotrimazole-betamethasone (LOTRISONE) 1-0.05 % cream Apply topically 2 (two) times a day for 7 days Twice a day for 2 weeks then weekly for 2 weeks maintain twice a week (Patient not taking: Reported on 6/18/2025)    [DISCONTINUED] escitalopram (Lexapro) 10 mg tablet Take 10 mg by mouth every 24 hours (Patient not taking: Reported on 6/18/2025)

## 2025-06-17 NOTE — ASSESSMENT & PLAN NOTE
Saw GI on 4/1/25. GI had ordered additional labs and US elastography which have not been completed.

## 2025-06-17 NOTE — ASSESSMENT & PLAN NOTE
Patient on eliquis for this and was on this medication when we first met back in December of 2023. She is not sure which leg she had DVT and is not sure if she ever had hypercoagulable workup.   I will put referral in for her to see the hematologist to determine  need for the eliquis  Saw hematology in consultation April 2025. Recommended continuing eliquis indefinitely give that there is no knowledge of whether event was provoked or unprovoked in past. In addition, she is sedentary

## 2025-06-18 ENCOUNTER — OFFICE VISIT (OUTPATIENT)
Dept: FAMILY MEDICINE CLINIC | Facility: CLINIC | Age: 80
End: 2025-06-18
Payer: COMMERCIAL

## 2025-06-18 ENCOUNTER — TELEPHONE (OUTPATIENT)
Age: 80
End: 2025-06-18

## 2025-06-18 VITALS
WEIGHT: 148.4 LBS | OXYGEN SATURATION: 94 % | BODY MASS INDEX: 27.14 KG/M2 | HEART RATE: 96 BPM | DIASTOLIC BLOOD PRESSURE: 58 MMHG | SYSTOLIC BLOOD PRESSURE: 124 MMHG

## 2025-06-18 DIAGNOSIS — M81.0 AGE-RELATED OSTEOPOROSIS WITHOUT CURRENT PATHOLOGICAL FRACTURE: ICD-10-CM

## 2025-06-18 DIAGNOSIS — N18.30 STAGE 3 CHRONIC KIDNEY DISEASE, UNSPECIFIED WHETHER STAGE 3A OR 3B CKD (HCC): ICD-10-CM

## 2025-06-18 DIAGNOSIS — K74.60 CIRRHOSIS OF LIVER WITHOUT ASCITES, UNSPECIFIED HEPATIC CIRRHOSIS TYPE (HCC): ICD-10-CM

## 2025-06-18 DIAGNOSIS — R63.4 WEIGHT LOSS: ICD-10-CM

## 2025-06-18 DIAGNOSIS — M54.50 LOW BACK PAIN POTENTIALLY ASSOCIATED WITH RADICULOPATHY: ICD-10-CM

## 2025-06-18 DIAGNOSIS — E78.00 HYPERCHOLESTEROLEMIA: ICD-10-CM

## 2025-06-18 DIAGNOSIS — E03.9 HYPOTHYROIDISM, UNSPECIFIED TYPE: ICD-10-CM

## 2025-06-18 DIAGNOSIS — Z86.718 HISTORY OF DVT OF LOWER EXTREMITY: Primary | ICD-10-CM

## 2025-06-18 DIAGNOSIS — N18.31 TYPE 2 DIABETES MELLITUS WITH STAGE 3A CHRONIC KIDNEY DISEASE, WITHOUT LONG-TERM CURRENT USE OF INSULIN (HCC): ICD-10-CM

## 2025-06-18 DIAGNOSIS — E11.22 TYPE 2 DIABETES MELLITUS WITH STAGE 3A CHRONIC KIDNEY DISEASE, WITHOUT LONG-TERM CURRENT USE OF INSULIN (HCC): ICD-10-CM

## 2025-06-18 DIAGNOSIS — J84.9 INTERSTITIAL LUNG DISEASE (HCC): ICD-10-CM

## 2025-06-18 PROCEDURE — 99214 OFFICE O/P EST MOD 30 MIN: CPT | Performed by: FAMILY MEDICINE

## 2025-06-18 NOTE — ASSESSMENT & PLAN NOTE
Patient follows with pulmonology at [facility] for this.   Her last visit with pulmonology was in December of 2023. Recommendation was for f/u in 6 months.

## 2025-06-18 NOTE — ASSESSMENT & PLAN NOTE
Dexa scan 1/17/24 t-score lumbar spine -0.7; t-score total hip -1.9; t-score femoral neck -2.8  Prolia 9/12/24. 3/18/25  Due for next prolia injection in September  Get cmp prior to injection

## 2025-06-18 NOTE — ASSESSMENT & PLAN NOTE
Lab Results   Component Value Date    TSH 0.691 06/04/2025   On levothyroxine 137 mcg daily  Continue same      Orders:    TSH, 3rd generation with Free T4 reflex; Future

## 2025-06-18 NOTE — ASSESSMENT & PLAN NOTE
She saw pain management 5/28/24 and had MRI Of lumbar spine done on 6/20/24 and this showed  Degenerative changes of the lumbar spine,   Last visit with pain management in January of this year at which time she was started on cymbalta 30

## 2025-06-18 NOTE — TELEPHONE ENCOUNTER
Patient call in to inform the office she lost and running late. I try to reach out to the office an got no answer to see if they will be able to see the patient. Please contact the patient and let her know before she get to the office if the doctor will see her. Thank you

## 2025-06-18 NOTE — PATIENT INSTRUCTIONS
stay on same dose of your levothyroxine.   2. Please bring all meds in a bag to your next visit.   3. Please get ultrasound done that GI had ordered along with the labs that they ordered back in December.   4. Please schedule appt to see lung doctor. You are overdue for appt there.   5. Get labs for me in September and f/u in September for prolia.

## 2025-06-18 NOTE — ASSESSMENT & PLAN NOTE
Lab Results   Component Value Date    EGFR 44 (L) 03/17/2025    EGFR 48 (L) 03/17/2025    EGFR 49 10/30/2024    CREATININE 1.25 (H) 03/17/2025    CREATININE 1.16 (H) 03/17/2025    CREATININE 1.07 10/30/2024   stable

## 2025-06-18 NOTE — PROGRESS NOTES
Diabetic Foot Exam    Patient's shoes and socks removed.    Right Foot/Ankle   Right Foot Inspection  Skin Exam: skin normal and skin intact. No dry skin, no warmth, no callus, no erythema, no maceration, no abnormal color, no pre-ulcer, no ulcer and no callus.     Toe Exam: ROM and strength within normal limits.     Sensory   Vibration: intact  Proprioception: intact  Monofilament testing: intact    Vascular  Capillary refills: elevated  The right DP pulse is 2+. The right PT pulse is 2+.     Left Foot/Ankle  Left Foot Inspection  Skin Exam: skin normal and skin intact. No dry skin, no warmth, no erythema, no maceration, normal color, no pre-ulcer, no ulcer and no callus.     Toe Exam: ROM and strength within normal limits.     Sensory   Vibration: intact  Proprioception: intact  Monofilament testing: intact    Vascular  Capillary refills: < 3 seconds  The left DP pulse is 2+. The left PT pulse is 2+.     Assign Risk Category  No deformity present  Loss of protective sensation  No weak pulses  Risk: 1

## 2025-06-18 NOTE — ASSESSMENT & PLAN NOTE
Lab Results   Component Value Date    HGBA1C 5.5 03/17/2025   On metformin.   Check labs in September followed by appt    Orders:    Albumin / creatinine urine ratio; Future    Comprehensive metabolic panel; Future    Hemoglobin A1C; Future

## 2025-07-15 ENCOUNTER — TELEPHONE (OUTPATIENT)
Dept: FAMILY MEDICINE CLINIC | Facility: CLINIC | Age: 80
End: 2025-07-15

## 2025-07-15 DIAGNOSIS — E03.9 HYPOTHYROIDISM, UNSPECIFIED TYPE: Primary | ICD-10-CM

## 2025-07-15 RX ORDER — LEVOTHYROXINE SODIUM 125 UG/1
125 TABLET ORAL
Qty: 100 TABLET | Refills: 3 | Status: SHIPPED | OUTPATIENT
Start: 2025-07-15

## 2025-08-14 ENCOUNTER — TELEPHONE (OUTPATIENT)
Dept: FAMILY MEDICINE CLINIC | Facility: CLINIC | Age: 80
End: 2025-08-14